# Patient Record
Sex: MALE | Race: WHITE | Employment: UNEMPLOYED | URBAN - METROPOLITAN AREA
[De-identification: names, ages, dates, MRNs, and addresses within clinical notes are randomized per-mention and may not be internally consistent; named-entity substitution may affect disease eponyms.]

---

## 2017-02-01 ENCOUNTER — OFFICE VISIT (OUTPATIENT)
Dept: ORTHOPEDIC SURGERY | Age: 67
End: 2017-02-01

## 2017-02-01 VITALS
DIASTOLIC BLOOD PRESSURE: 53 MMHG | BODY MASS INDEX: 18.55 KG/M2 | WEIGHT: 133 LBS | SYSTOLIC BLOOD PRESSURE: 96 MMHG | HEART RATE: 70 BPM | TEMPERATURE: 98 F

## 2017-02-01 DIAGNOSIS — S82.892D ANKLE FRACTURE, LEFT, CLOSED, WITH ROUTINE HEALING, SUBSEQUENT ENCOUNTER: Primary | ICD-10-CM

## 2017-02-01 NOTE — MR AVS SNAPSHOT
Visit Information Date & Time Provider Department Dept. Phone Encounter #  
 2/1/2017  1:20 PM Nallely Doty MD [facility]  Your Appointments 3/6/2017 11:00 AM  
MEDICARE EXT with Henok Porter MD  
[address] ([facility]) Appt Note: . ... [address] [address] 2 Hemant Shabazzza [address] [address] Upcoming Health Maintenance Date Due ZOSTER VACCINE AGE 60> 2/3/2010 GLAUCOMA SCREENING Q2Y 2/3/2015 MEDICARE YEARLY EXAM 3/1/2017 Pneumococcal 65+ Low/Medium Risk (2 of 2 - PPSV23) 7/12/2019 COLONOSCOPY 2/28/2021 DTaP/Tdap/Td series (3 - Td) 2/28/2026 Allergies as of 2/1/2017  Review Complete On: 2/1/2017 By: Nallely Doty MD  
  
 Severity Noted Reaction Type Reactions Levaquin [Levofloxacin]  03/16/2011    Rash Other reaction(s): mild rash/itching Other Medication  12/10/2009    Other (comments) Bee stings PPD- skin test  
 Pcn [Penicillins]  02/22/2010    Unable to Obtain Other reaction(s): unknown Tuberculin, Old Skin Test  08/22/2014    Other (comments) Venom-honey Bee  08/22/2014 Other reaction(s): anaphylaxis/angioedema, swelling Current Immunizations  Reviewed on 9/10/2014 Name Date Influenza High Dose Vaccine PF 11/22/2016, 12/7/2015 Influenza Vaccine 9/10/2014, 3/24/2014 Influenza Vaccine Split 12/10/2009 Pneumococcal Conjugate (PCV-13) 12/7/2015 Pneumococcal Polysaccharide (PPSV-23) 7/12/2014  5:32 PM  
 Tdap 2/29/2016, 11/3/2011 Not reviewed this visit You Were Diagnosed With   
  
 Codes Comments Ankle fracture, left, closed, with routine healing, subsequent encounter    -  Primary ICD-10-CM: M15.973E ICD-9-CM: V54.19 Vitals BP Pulse Temp Weight(growth percentile) BMI Smoking Status 96/53 (BP 1 Location: Left arm, BP Patient Position: Sitting) 70 98 °F (36.7 °C) (Oral) 133 lb (60.3 kg) 18.55 kg/m2 Never Smoker Vitals History BMI and BSA Data Body Mass Index Body Surface Area 18.55 kg/m 2 1.74 m 2 Preferred Pharmacy Pharmacy Name Phone [facility] [address], [address] 2/3 [phone] Your Updated Medication List  
  
   
This list is accurate as of: 2/1/17  2:19 PM.  Always use your most recent med list.  
  
  
  
  
 alendronate 70 mg tablet Commonly known as:  FOSAMAX Take 1 tabPO once weekly 30 min BEFORE any food of the day  with full glass H20,DON'T LAY DOWN for 30MIN AFTER med taken. calcium-cholecalciferol (D3) tablet Commonly known as:  Calcium 600 + D Take 1 Tab by mouth two (2) times a day. docusate sodium 100 mg capsule Commonly known as:  Temple Lecher Take 1 Cap by mouth nightly as needed for Constipation. EPINEPHrine 0.3 mg/0.3 mL injection Commonly known as:  EPIPEN  
0.3 mL by IntraMUSCular route once as needed. felbamate 600 mg tablet Commonly known as:  Kuldeep Pipe Take 1 Tab by mouth four (4) times daily. fluticasone 50 mcg/actuation nasal spray Commonly known as:  Naman Sacks 2 Sprays by Both Nostrils route daily. food supplemt, lactose-reduced Liqd Commonly known as:  ENSURE Take 237 mL by mouth four (4) times daily. levothyroxine 88 mcg tablet Commonly known as:  SYNTHROID  
TAKE 1 TABLET BY MOUTH DAILY BEFORE BREAKFAST  
  
 loperamide 2 mg tablet Commonly known as:  IMMODIUM Take 2 mg by mouth four (4) times daily as needed for Diarrhea.  
  
 loratadine 10 mg tablet Commonly known as:  CLARITIN  
TAKE 1 TABLET BY MOUTH ONCE DAILY LORazepam 0.5 mg tablet Commonly known as:  ATIVAN Take  by mouth nightly. megestrol 40 mg tablet Commonly known as:  MEGACE  
TAKE 1 TABLET BY MOUTH ONCE DAILY metoprolol tartrate 50 mg tablet Commonly known as:  LOPRESSOR  
TAKE 1 TABLET BY MOUTH TWICE DAILY  
  
 mirtazapine 30 mg tablet Commonly known as:  Consheydila Cullens Take 30 mg by mouth two (2) times a day. multivitamins-minerals-lutein Tab tablet Commonly known as:  MEN'S CENTRUM SILVER WITH LUTEIN Take 1 Tab by mouth daily. polyethylene glycol 17 gram packet Commonly known as:  Leata Sans Take 1 Packet by mouth daily as needed. TOPAMAX 200 mg tablet Generic drug:  topiramate Take 200 mg by mouth two (2) times a day. VIMPAT 200 mg Tab tablet Generic drug:  lacosamide Take 200 mg by mouth two (2) times a day. vitamin a & d ointment Commonly known as:  A&D Apply  to affected area as needed. We Performed the Following AMB POC XRAY, ANKLE; COMPLETE, 3+ VIE [75423 CPT(R)] AMB SUPPLY ORDER [[id number] Custom] Comments:  
 [employer] extra depth shoe that accomodates his AS/AC brace Introducing [facility] & HEALTH SERVICES! Dear Maricarmen Sutton: 
Thank you for requesting a [employer] account. Our records indicate that you already have an active H[employer] account. You can access your account anytime at [email or web address] Did you know that you can access your hospital and ER discharge instructions at any time in Hakia? You can also review all of your test results from your hospital stay or ER visit. Additional Information If you have questions, please visit the Frequently Asked Questions section of the H[employer] website at [email or web address] Remember, Hakia is NOT to be used for urgent needs. For medical emergencies, dial 911. Now available from your iPhone and Android! Please provide this summary of care documentation to your next provider. Your primary care clinician is listed as [address]. If you have any questions after today's visit, please call [phone].

## 2017-02-01 NOTE — PROGRESS NOTES
AMBULATORY PROGRESS NOTE      Patient: Phillip Ashton             MRN: [id number]     SSN: [id number] Body mass index is 18.55 kg/(m^2). YOB: 1950     AGE: 77 y.o. EX: male    PCP: Kumar Armendariz MD    IMPRESSION/DIAGNOSIS AND TREATMENT PLAN     DIAGNOSES  1. Ankle fracture, left, closed, with routine healing, subsequent encounter        Orders Placed This Encounter    Generic Supply Order    [[id number]] Ankle Min 3V      Phillip Ashton understands his diagnoses and the proposed plan. Plan:    My plan is to have him go to [facility] for an extra-depth shoe that will accommodate the AirSport AirCast brace. He can weight bear as tolerated when he transfers. He must have the shoe on and the AirSport AirCast brace on. I am going to see him on an as needed basis. Should his symptoms worsen, of course we will see him sooner. I am happy to fill out any additional paperwork that [employer] might have for me. HPI [address] IS A 77 y.o. male who presents to my outpatient office for follow up on left ankle fracture that occurred in July 2016. He presents today with his caretaker wearing custom clam-shell crow walker boot. He has not walked in several years. ANKLE/FOOT Left      Psychiatry: Alert, accompanied by care givers, non communicative  Tenderness: no tenderness /in cast  Cutaneous: WNL,    Joint Motion: WNL  Joint / Tendon Stability: No Ankle or Subtalar instability or joint laxity.     No peroneal sublux ability or dislocation  Alignment: Normal Foot Alignment and Flexible  Neuro Motor/Sensory: NL/NL  Vascular: NL foot/ankle pulses  Lymphatics: No extremity lymphedema, No calf swelling, no tenderness to calf muscles. CHART REVIEW     Past Medical History   Diagnosis Date    Cataract 12/10/2009    Chronic back pain      History of chronic back problems off and on for the past several years.   He has responded to back injections in the past.      Dysphagia     Fall      The patient fell during a seizure. He noted increased back pain since that time. X-rays were reported negative at [facility].     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  loratadine (CLARITIN) 10 mg tablet TAKE 1 TABLET BY MOUTH ONCE DAILY    EPINEPHrine (EPIPEN) 0.3 mg/0.3 mL injection 0.3 mL by IntraMUSCular route once as needed.  alendronate (FOSAMAX) 70 mg tablet Take 1 tabPO once weekly 30 min BEFORE any food of the day  with full glass H20,DON'T LAY DOWN for 30MIN AFTER med taken.  polyethylene glycol (MIRALAX) 17 gram packet Take 1 Packet by mouth daily as needed.  vitamin a & d (A&D) ointment Apply  to affected area as needed.  docusate sodium (COLACE) 100 mg capsule Take 1 Cap by mouth nightly as needed for Constipation.  fluticasone (FLONASE) 50 mcg/actuation nasal spray 2 Sprays by Both Nostrils route daily.  calcium-cholecalciferol, D3, (CALCIUM 600 + D) tablet Take 1 Tab by mouth two (2) times a day.  LORazepam (ATIVAN) 0.5 mg tablet Take  by mouth nightly.  mirtazapine (REMERON) 30 mg tablet Take 30 mg by mouth two (2) times a day.  food supplement, lactose-free (ENSURE) liqd Take 237 mL by mouth four (4) times daily.  lacosamide (VIMPAT) 200 mg Tab tablet Take 200 mg by mouth two (2) times a day.     topiramate (TOPAMAX) 200 mg tablet Take 200 mg by mouth two (2) times a day.  metoprolol tartrate (LOPRESSOR) 50 mg tablet TAKE 1 TABLET BY MOUTH TWICE DAILY    multivitamins-minerals-lutein (MEN'S CENTRUM SILVER WITH LUTEIN) tab tablet Take 1 Tab by mouth daily.  felbamate (FELBATOL) 600 mg tablet Take 1 Tab by mouth four (4) times daily. No current facility-administered medications for this visit. Allergies   Allergen Reactions    Levaquin [Levofloxacin] Rash     Other reaction(s): mild rash/itching    Other Medication Other (comments)     Bee stings  PPD- skin test    Pcn [Penicillins] Unable to Obtain     Other reaction(s): unknown    Tuberculin, Old Skin Test Other (comments)    Venom-Honey Bee      Other reaction(s): anaphylaxis/angioedema, swelling     Past Surgical History   Procedure Laterality Date    Hx cataract removal       right eye    Hx orthopaedic       vertebral compression fracture     Social History     Occupational History    Not on file. Social History Main Topics    Smoking status: Never Smoker    Smokeless tobacco: Never Used    Alcohol use No    Drug use: No    Sexual activity: No     Family History   Problem Relation Age of Onset    Cancer Mother     Cancer Father        REVIEW OF SYSTEMS : 2/1/2017  ALL BELOW ARE Negative except : SEE HPI       Constitutional: Negative for fever, chills and weight loss. Neg Weigh Loss  Cardiovascular: Negative for chest pain, claudication and leg swelling. SOB, BAXTER   Gastrointestinal: Negative for  pain, N/V/D/C, Blood in stool or urine,dysuria, hematuria,        Incontinence, pelvic pain  Musculoskeletal: see HPI. Neurological: Negative for dizziness and weakness. Negative for headaches,Visual Changes, Confusion, Seizures,   Psychiatric/Behavioral: Negative for depression, memory loss and substance abuse. Extremities:  Negative for  hair changes, rash or skin lesion changes.   Hematologic: Negative for Bleeding problems, bruising, pallor or swollen lymph nodes. Peripheral Vascular: No calf pain, vascular vein tenderness to calf pain              No calf throbbing, posterior knee throbbing pain    DIAGNOSTIC IMAGING     ANKLE X RAYS 3 VIEWS Left   2/1/2017    SOFT TISSUES:                No soft tissue calcifications,   No Calcified blood vessels     Soft tissue swelling location:    mild  to fibula region        no medial aspect    no dorsal midfoot/forefoot     OSSEOUS:      Fractures of the lateral malleolus is not  present as he is healed the fibual fx  Healing of the fracture/s is/are: complete,    Fibula slight ankle valgus and Slight shortening   Ankle mortise alignment is: plantar flexion deformity, slight valgus alignment  Talar Dome:  No Osteochondral defects seen      No subluxations, dislocations are noted to ankle or subtalar joint regions  Mineralization: suggests no Osteopenia   Bone Spurs: No significant bone spurs       I have personally reviewed the images of the above study. The interpretation of this study is my professional opinion.     Florin Garcia MD  2/1/2017  2:08 PM         Written by Yuly Brambila, as dictated by Khurram Reed MD.

## 2017-02-01 NOTE — PROCEDURES
ANKLE X RAYS 3 VIEWS Left   2/1/2017    SOFT TISSUES:                No soft tissue calcifications,   No Calcified blood vessels     Soft tissue swelling location:    mild  to fibula region        no medial aspect    no dorsal midfoot/forefoot     OSSEOUS:      Fractures of the lateral malleolus is not  present as he is healed the fibual fx  Healing of the fracture/s is/are: complete,    Fibula slight ankle valgus and Slight shortening   Ankle mortise alignment is: plantar flexion deformity, slight valgus alignment  Talar Dome:  No Osteochondral defects seen      No subluxations, dislocations are noted to ankle or subtalar joint regions  Mineralization: suggests no Osteopenia   Bone Spurs: No significant bone spurs       I have personally reviewed the images of the above study. The interpretation of this study is my professional opinion.     Alejandro Dykes MD  2/1/2017  2:08 PM

## 2017-02-21 RX ORDER — MULTIVIT-MIN/FA/LYCOPEN/LUTEIN .4-300-25
TABLET ORAL
Qty: 30 TAB | Refills: 11 | Status: SHIPPED | OUTPATIENT
Start: 2017-02-21 | End: 2017-06-25 | Stop reason: ALTCHOICE

## 2017-02-21 RX ORDER — MULTIVITAMIN
TABLET ORAL
Qty: 60 TAB | Refills: 11 | Status: SHIPPED | OUTPATIENT
Start: 2017-02-21 | End: 2017-03-06 | Stop reason: SDUPTHER

## 2017-03-06 ENCOUNTER — OFFICE VISIT (OUTPATIENT)
Dept: FAMILY MEDICINE CLINIC | Age: 67
End: 2017-03-06

## 2017-03-06 VITALS — HEIGHT: 71 IN | RESPIRATION RATE: 16 BRPM

## 2017-03-06 DIAGNOSIS — Z00.00 ROUTINE GENERAL MEDICAL EXAMINATION AT A HEALTH CARE FACILITY: Primary | ICD-10-CM

## 2017-03-06 RX ORDER — MULTIVITAMIN
TABLET ORAL
Qty: 60 TAB | Refills: 11 | Status: SHIPPED | OUTPATIENT
Start: 2017-03-06 | End: 2018-03-12 | Stop reason: SDUPTHER

## 2017-03-06 NOTE — PATIENT INSTRUCTIONS

## 2017-03-06 NOTE — MR AVS SNAPSHOT
Visit Information Date & Time Provider Department Dept. Phone Encounter #  
 3/6/2017 11:00 AM Henok Porter [address] [id number] Upcoming Health Maintenance Date Due ZOSTER VACCINE AGE 60> 2/3/2010 GLAUCOMA SCREENING Q2Y 2/3/2015 MEDICARE YEARLY EXAM 3/1/2017 Pneumococcal 65+ Low/Medium Risk (2 of 2 - PPSV23) 7/12/2019 COLONOSCOPY 2/28/2021 DTaP/Tdap/Td series (3 - Td) 2/28/2026 Allergies as of 3/6/2017  Review Complete On: 3/6/2017 By: Simba Guzmán LPN Severity Noted Reaction Type Reactions Levaquin [Levofloxacin]  03/16/2011    Rash Other reaction(s): mild rash/itching Other Medication  12/10/2009    Other (comments) Bee stings PPD- skin test  
 Pcn [Penicillins]  02/22/2010    Unable to Obtain Other reaction(s): unknown Tuberculin, Old Skin Test  08/22/2014    Other (comments) Venom-honey Bee  08/22/2014 Other reaction(s): anaphylaxis/angioedema, swelling Current Immunizations  Reviewed on 9/10/2014 Name Date Influenza High Dose Vaccine PF 11/22/2016, 12/7/2015 Influenza Vaccine 9/10/2014, 3/24/2014 Influenza Vaccine Split 12/10/2009 Pneumococcal Conjugate (PCV-13) 12/7/2015 Pneumococcal Polysaccharide (PPSV-23) 7/12/2014  5:32 PM  
 Tdap 2/29/2016, 11/3/2011 Not reviewed this visit You Were Diagnosed With   
  
 Codes Comments Routine general medical examination at a health care facility    -  Primary ICD-10-CM: Z00.00 ICD-9-CM: V70.0 Vitals Resp Height(growth percentile) Smoking Status 16 5' 11\" (1.803 m) Never Smoker Preferred Pharmacy Pharmacy Name Phone [facility] [address] [facility] 2/3 [phone] Your Updated Medication List  
  
   
This list is accurate as of: 3/6/17 11:45 AM.  Always use your most recent med list.  
  
  
  
  
 alendronate 70 mg tablet Commonly known as:  FOSAMAX Take 1 tabPO once weekly 30 min BEFORE any food of the day  with full glass H20,DON'T LAY DOWN for 30MIN AFTER med taken. calcium-cholecalciferol (D3) tablet Commonly known as:  CALTRATE 600+D TAKE 1 TABLET BY MOUTH TWICE DAILY CEROVITE SENIOR Tab tablet Generic drug:  multivitamins-minerals-lutein TAKE 1 TABLET BY MOUTH ONCE DAILY docusate sodium 100 mg capsule Commonly known as:  Melody Peels Take 1 Cap by mouth nightly as needed for Constipation. EPINEPHrine 0.3 mg/0.3 mL injection Commonly known as:  EPIPEN  
0.3 mL by IntraMUSCular route once as needed. felbamate 600 mg tablet Commonly known as:  Massiel Argelia Take 1 Tab by mouth four (4) times daily. fluticasone 50 mcg/actuation nasal spray Commonly known as:  Abelino Peel 2 Sprays by Both Nostrils route daily. food supplemt, lactose-reduced Liqd Commonly known as:  ENSURE Take 237 mL by mouth four (4) times daily. levothyroxine 88 mcg tablet Commonly known as:  SYNTHROID  
TAKE 1 TABLET BY MOUTH DAILY BEFORE BREAKFAST  
  
 loperamide 2 mg tablet Commonly known as:  IMMODIUM Take 2 mg by mouth four (4) times daily as needed for Diarrhea.  
  
 loratadine 10 mg tablet Commonly known as:  CLARITIN  
TAKE 1 TABLET BY MOUTH ONCE DAILY LORazepam 0.5 mg tablet Commonly known as:  ATIVAN Take  by mouth nightly. megestrol 40 mg tablet Commonly known as:  MEGACE  
TAKE 1 TABLET BY MOUTH ONCE DAILY  
  
 metoprolol tartrate 50 mg tablet Commonly known as:  LOPRESSOR  
TAKE 1 TABLET BY MOUTH TWICE DAILY  
  
 mirtazapine 30 mg tablet Commonly known as:  [place] Brightly Take 30 mg by mouth two (2) times a day. multivit-min-FA-lycopen-lutein 0.4-300-250 mg-mcg-mcg Tab Commonly known as:  CENTRUM SILVER Take 1 Tab by mouth daily. polyethylene glycol 17 gram packet Commonly known as:  Evie Floor Take 1 Packet by mouth daily as needed. TOPAMAX 200 mg tablet Generic drug:  topiramate Take 200 mg by mouth two (2) times a day. VIMPAT 200 mg Tab tablet Generic drug:  lacosamide Take 200 mg by mouth two (2) times a day. vitamin a & d ointment Commonly known as:  A&D Apply  to affected area as needed. Prescriptions Sent to Pharmacy Refills  
 multivit-min-FA-lycopen-lutein (CENTRUM SILVER) 0.4-300-250 mg-mcg-mcg tab 11 Sig: Take 1 Tab by mouth daily. Class: Normal  
 Pharmacy: [address] 2/3 Ph #: [phone] Route: Oral  
 calcium-cholecalciferol, D3, (CALTRATE 600+D) tablet 11 Sig: TAKE 1 TABLET BY MOUTH TWICE DAILY Class: Normal  
 Pharmacy: [address] 2/3 Ph #: [phone] Patient Instructions Well Visit, Over 72: Care Instructions Your Care Instructions Physical exams can help you stay healthy. Your doctor has checked your overall health and may have suggested ways to take good care of yourself. He or she also may have recommended tests. At home, you can help prevent illness with healthy eating, regular exercise, and other steps. Follow-up care is a key part of your treatment and safety. Be sure to make and go to all appointments, and call your doctor if you are having problems. It's also a good idea to know your test results and keep a list of the medicines you take. How can you care for yourself at home? · Reach and stay at a healthy weight. This will lower your risk for many problems, such as obesity, diabetes, heart disease, and high blood pressure. · Get at least 30 minutes of exercise on most days of the week. Walking is a good choice. You also may want to do other activities, such as running, swimming, cycling, or playing tennis or team sports. · Do not smoke. Smoking can make health problems worse.  If you need help quitting, talk to your doctor about stop-smoking programs and medicines. These can increase your chances of quitting for good. · Protect your skin from too much sun. When you're outdoors from 10 a.m. to 4 p.m., stay in the shade or cover up with clothing and a hat with a wide brim. Wear sunglasses that block UV rays. Even when it's cloudy, put broad-spectrum sunscreen (SPF 30 or higher) on any exposed skin. · See a dentist one or two times a year for checkups and to have your teeth cleaned. · Wear a seat belt in the car. · Limit alcohol to 2 drinks a day for men and 1 drink a day for women. Too much alcohol can cause health problems. Follow your doctor's advice about when to have certain tests. These tests can spot problems early. For men and women · Cholesterol. Your doctor will tell you how often to have this done based on your overall health and other things that can increase your risk for heart attack and stroke. · Blood pressure. Have your blood pressure checked during a routine doctor visit. Your doctor will tell you how often to check your blood pressure based on your age, your blood pressure results, and other factors. · Diabetes. Ask your doctor whether you should have tests for diabetes. · Vision. Experts recommend that you have yearly exams for glaucoma and other age-related eye problems. · Hearing. Tell your doctor if you notice any change in your hearing. You can have tests to find out how well you hear. · Colon cancer tests. Keep having colon cancer tests as your doctor recommends. You can have one of several types of tests. · Heart attack and stroke risk. At least every 4 to 6 years, you should have your risk for heart attack and stroke assessed. Your doctor uses factors such as your age, blood pressure, cholesterol, and whether you smoke or have diabetes to show what your risk for a heart attack or stroke is over the next 10 years. · Osteoporosis. Talk to your doctor about whether you should have a bone density test to find out whether you have thinning bones. Also ask your doctor about whether you should take calcium and vitamin D supplements. For women · Pap test and pelvic exam. You may no longer need a Pap test. Talk with your doctor about whether to stop or continue to have Pap tests. · Breast exam and mammogram. Ask how often you should have a mammogram, which is an X-ray of your breasts. A mammogram can spot breast cancer before it can be felt and when it is easiest to treat. · Thyroid disease. Talk to your doctor about whether to have your thyroid checked as part of a regular physical exam. Women have an increased chance of a thyroid problem. For men · Prostate exam. Talk to your doctor about whether you should have a blood test (called a PSA test) for prostate cancer. Experts disagree on whether men should have this test. Some experts recommend that you discuss the benefits and risks of the test with your doctor. · Abdominal aortic aneurysm. Ask your doctor whether you should have a test to check for an aneurysm. You may need a test if you ever smoked or if your parent, brother, sister, or child has had an aneurysm. When should you call for help? Watch closely for changes in your health, and be sure to contact your doctor if you have any problems or symptoms that concern you. Where can you learn more? Go to http://elvia-arden.info/. Enter E510 in the search box to learn more about \"Well Visit, Over 65: Care Instructions. \" Current as of: July 19, 2016 Content Version: 11.1 © 9979-0328 Healthwise, Incorporated. Care instructions adapted under license by [employer] (which disclaims liability or warranty for this information).  If you have questions about a medical condition or this instruction, always ask your healthcare professional. [employer] disclaims any warranty or liability for your use of this information. Introducing [facility] & HEALTH SERVICES! Dear Antonio Caban: 
Thank you for requesting a [employer] account. Our records indicate that you already have an active [employer] account. You can access your account anytime at [email or web address] Did you know that you can access your hospital and ER discharge instructions at any time in [employer]? You can also review all of your test results from your hospital stay or ER visit. Additional Information If you have questions, please visit the Frequently Asked Questions section of the [employer] website at [email or web address] Remember, [employer] is NOT to be used for urgent needs. For medical emergencies, dial 911. Now available from your iPhone and Android! Please provide this summary of care documentation to your next provider. Your primary care clinician is listed as [address]. If you have any questions after today's visit, please call [phone].

## 2017-03-06 NOTE — PROGRESS NOTES
This is a Subsequent Medicare Annual Wellness Visit providing Personalized Prevention Plan Services (PPPS) (Performed 12 months after initial AWV and PPPS )    I have reviewed the patient's medical history in detail and updated the computerized patient record. Pt somewhat agitated today; would not let nursing get all vitals. Pt also refuses BP and exam from this provider today. He is a [facility] resident; He is here with a counselor;  Reports some anorexia which is chronic ; Pt is on megace already. History     Past Medical History:   Diagnosis Date    Cataract 12/10/2009    Chronic back pain     History of chronic back problems off and on for the past several years. He has responded to back injections in the past.      Dysphagia     Fall     The patient fell during a seizure. He noted increased back pain since that time. X-rays were reported negative at [facility].     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 30 Tab 11    calcium-cholecalciferol, D3, (CALTRATE 600+D) tablet TAKE 1 TABLET BY MOUTH TWICE DAILY 60 Tab 11    levothyroxine (SYNTHROID) 88 mcg tablet TAKE 1 TABLET BY MOUTH DAILY BEFORE BREAKFAST 30 Tab 6    megestrol (MEGACE) 40 mg tablet TAKE 1 TABLET BY MOUTH ONCE DAILY 30 Tab 3    loperamide (IMMODIUM) 2 mg tablet Take 2 mg by mouth four (4) times daily as needed for Diarrhea.       loratadine (CLARITIN) 10 mg tablet TAKE 1 TABLET BY MOUTH ONCE DAILY 30 Tab 6    EPINEPHrine (EPIPEN) 0.3 mg/0.3 mL injection 0.3 mL by IntraMUSCular route once as needed. 1 mL 3    alendronate (FOSAMAX) 70 mg tablet Take 1 tabPO once weekly 30 min BEFORE any food of the day  with full glass H20,DON'T LAY DOWN for 30MIN AFTER med taken. 4 Tab 6    polyethylene glycol (MIRALAX) 17 gram packet Take 1 Packet by mouth daily as needed. 30 Each 6    metoprolol tartrate (LOPRESSOR) 50 mg tablet TAKE 1 TABLET BY MOUTH TWICE DAILY 60 Tab 6    vitamin a & d (A&D) ointment Apply  to affected area as needed. 60 g 6    docusate sodium (COLACE) 100 mg capsule Take 1 Cap by mouth nightly as needed for Constipation. 90 Cap 2    fluticasone (FLONASE) 50 mcg/actuation nasal spray 2 Sprays by Both Nostrils route daily. 1 Bottle 6    LORazepam (ATIVAN) 0.5 mg tablet Take  by mouth nightly.  mirtazapine (REMERON) 30 mg tablet Take 30 mg by mouth two (2) times a day.  food supplement, lactose-free (ENSURE) liqd Take 237 mL by mouth four (4) times daily. 1000 mL 3    lacosamide (VIMPAT) 200 mg Tab tablet Take 200 mg by mouth two (2) times a day.  felbamate (FELBATOL) 600 mg tablet Take 1 Tab by mouth four (4) times daily. 120 Tab 6    topiramate (TOPAMAX) 200 mg tablet Take 200 mg by mouth two (2) times a day.       CEROVITE SENIOR tab tablet TAKE 1 TABLET BY MOUTH ONCE DAILY 30 Tab 11     Allergies   Allergen Reactions    Levaquin [Levofloxacin] Rash     Other reaction(s): mild rash/itching    Other Medication Other (comments)     Bee stings  PPD- skin test    Pcn [Penicillins] Unable to Obtain     Other reaction(s): unknown    Tuberculin, Old Skin Test Other (comments)    Venom-Honey Bee      Other reaction(s): anaphylaxis/angioedema, swelling     Family History   Problem Relation Age of Onset    Cancer Mother     Cancer Father      Social History   Substance Use Topics    Smoking status: Never Smoker  Smokeless tobacco: Never Used    Alcohol use No     Patient Active Problem List   Diagnosis Code    Hypothyroidism E03.9    Hearing loss H91.90    PPD positive R76.11    Seizure ([address] R56.9    Cataract H26.9    Osteoporosis M81.0    Mental retardation F79    Abnormal finding on EKG R94.31    Pneumonia J18.9       Depression Risk Factor Screening:     PHQ 2 / 9, over the last two weeks 1/29/2015   Little interest or pleasure in doing things Several days   Feeling down, depressed or hopeless Several days   Total Score PHQ 2 2     Alcohol Risk Factor Screening: On any occasion during the past 3 months, have you had more than 4 drinks containing alcohol? No    Do you average more than 14 drinks per week? No      Functional Ability and Level of Safety:     Hearing Loss   none    Activities of Daily Living   Total assistance. Requires assistance with: ambulation, bathing and hygiene, feeding, continence, grooming, toileting and dressing    Fall Risk     Fall Risk Assessment, last 12 mths 7/22/2016   Able to walk? Yes   Fall in past 12 months? Yes   Fall with injury? Yes   Number of falls in past 12 months 4   Fall Risk Score 5     Abuse Screen   unable to answer question    Review of Systems   A comprehensive review of systems was negative except for that written in the HPI. Physical Examination     Evaluation of Cognitive Function:  Mood/affect:  neutral  Appearance: age appropriate  Family member/caregiver input: none    Visit Vitals    Resp 16    Ht 5' 11\" (1.803 m)     General appearance: alert, cooperative, no distress, appears stated age  Neck: supple, symmetrical, trachea midline, no adenopathy, thyroid: not enlarged, symmetric, no tenderness/mass/nodules, no carotid bruit and no JVD  Lungs: clear to auscultation bilaterally  Heart: regular rate and rhythm, S1, S2 normal, no murmur, click, rub or gallop  Abdomen: soft, non-tender.  Bowel sounds normal. No masses,  no organomegaly  Extremities: extremities normal, atraumatic, no cyanosis or edema      Patient Care Team:  Medina Cary MD as PCP - General (Family Practice)  MD Stella Rojas MD (Gastroenterology)  Abimbola Young MD (Dermatology)  Michael Foster DPM (Podiatry)  Wesley Monet MD (Neurology)  Madeleine Marroquin MD (Orthopedic Surgery)  Pratibha Mcdermott MD (Ophthalmology)  Stella Winkler MD (Gastroenterology)  Abimbola Young MD (Dermatology)  Michael Foster DPM (Podiatry)  Wesley Monet MD (Neurology)  Madeleine Marroquin MD (Orthopedic Surgery)  Pratibha Mcdermott MD (Ophthalmology)  Anup Ayala MD    Advice/Referrals/Counseling   Education and counseling provided:  Are appropriate based on today's review and evaluation      Assessment/Plan       ICD-10-CM ICD-9-CM    1. Routine general medical examination at a health care facility Z00.00 V70.0    . As above, pt clinically stable; he is somewhat agitated today ; Advised follow up with Psychiatry with respect to increased agitation  Follow-up Disposition:  Return in about 5 months (around 8/6/2017). An After Visit Summary was printed and given to the patient. This has been fully explained to the patient, who indicates understanding.   Advised supplement with boost/ ensure for meal replacement  Filled out forms needed for [facility]

## 2017-03-14 ENCOUNTER — TELEPHONE (OUTPATIENT)
Dept: FAMILY MEDICINE CLINIC | Age: 67
End: 2017-03-14

## 2017-03-14 NOTE — TELEPHONE ENCOUNTER
Spoke with Geneva Patel at [facility] to inform that  chest x-ray was normal. Geneva Patel verbalized understanding.

## 2017-03-14 NOTE — TELEPHONE ENCOUNTER
Spoke with Mark Fairchild at [facility] to gather information. Helena Handy would like to inquire if any concerns with chest x-ray done on 02/22/2017. Will inform Dr. Christine Reina. Helena Handy verbalized understanding and asked that return call be sent to [facility].

## 2017-06-05 NOTE — TELEPHONE ENCOUNTER
Requested Prescriptions     Pending Prescriptions Disp Refills    alendronate (FOSAMAX) 70 mg tablet 4 Tab 6     Sig: Take 1 tabPO once weekly 30 min BEFORE any food of the day  with full glass H20,DON'T LAY DOWN for 30MIN AFTER med taken.     metoprolol tartrate (LOPRESSOR) 50 mg tablet 60 Tab 6     Sig: TAKE 1 TABLET BY MOUTH TWICE DAILY    loratadine (CLARITIN) 10 mg tablet 30 Tab 6     Sig: TAKE 1 TABLET BY MOUTH ONCE DAILY

## 2017-06-06 ENCOUNTER — TELEPHONE (OUTPATIENT)
Dept: FAMILY MEDICINE CLINIC | Age: 67
End: 2017-06-06

## 2017-06-06 RX ORDER — ALENDRONATE SODIUM 70 MG/1
TABLET ORAL
Qty: 4 TAB | Refills: 6 | Status: SHIPPED | OUTPATIENT
Start: 2017-06-06 | End: 2017-11-14 | Stop reason: ALTCHOICE

## 2017-06-06 RX ORDER — METOPROLOL TARTRATE 50 MG/1
TABLET ORAL
Qty: 60 TAB | Refills: 6 | Status: SHIPPED | OUTPATIENT
Start: 2017-06-06 | End: 2017-12-22 | Stop reason: SDUPTHER

## 2017-06-06 RX ORDER — LORATADINE 10 MG/1
TABLET ORAL
Qty: 30 TAB | Refills: 6 | Status: SHIPPED | OUTPATIENT
Start: 2017-06-06 | End: 2018-03-12 | Stop reason: SDUPTHER

## 2017-06-06 NOTE — TELEPHONE ENCOUNTER
Spoke with Michael Amos at [facility] that stated that form was to be reviewed by audit personnel and our office would be contacted if any further information was needed. Informed as per Dr. Ale Sharma, that  can contact the office to speak with Dr. Ale Sharma about concerns. Ms. Myla Zapata verbalized understanding.

## 2017-06-06 NOTE — TELEPHONE ENCOUNTER
Spoke with Richar Pillai at [facility] to inquire about Certificate of Physical Examination. Information that was not gathered was not filled in on form and did not appear to need any documentation. Ms. Beatriz Oliveros stated that form would need to be reviewed and to fax form to [facility]. Ms. Beatriz Oliveros requested a letter to explain chest x-ray done on 02/22/2017 for  that had questions. Will inform Dr. Barbara Renteria. Ms. Beatriz Oliveros verbalized understanding. Form was faxed to office and confirmation received.

## 2017-06-16 ENCOUNTER — OFFICE VISIT (OUTPATIENT)
Dept: ORTHOPEDIC SURGERY | Facility: CLINIC | Age: 67
End: 2017-06-16

## 2017-06-16 VITALS — WEIGHT: 150 LBS | BODY MASS INDEX: 24.21 KG/M2

## 2017-06-16 DIAGNOSIS — H91.90 HEARING LOSS, UNSPECIFIED HEARING LOSS TYPE, UNSPECIFIED LATERALITY: ICD-10-CM

## 2017-06-16 DIAGNOSIS — M85.80 OSTEOPENIA DETERMINED BY X-RAY: ICD-10-CM

## 2017-06-16 DIAGNOSIS — F79 INTELLECTUAL DISABILITY: ICD-10-CM

## 2017-06-16 DIAGNOSIS — G40.909 SEIZURE DISORDER (HCC): ICD-10-CM

## 2017-06-16 DIAGNOSIS — S42.032A CLOSED DISPLACED FRACTURE OF ACROMIAL END OF LEFT CLAVICLE, INITIAL ENCOUNTER: Primary | ICD-10-CM

## 2017-06-16 DIAGNOSIS — M25.512 ACUTE PAIN OF LEFT SHOULDER: ICD-10-CM

## 2017-06-16 RX ORDER — ACETAMINOPHEN 500 MG
500 TABLET ORAL
Qty: 60 TAB | Refills: 3 | Status: SHIPPED | OUTPATIENT
Start: 2017-06-16 | End: 2017-07-15

## 2017-06-16 NOTE — PROGRESS NOTES
Patient: Dallas Martinez                MRN: [id number]       SSN: [id number]  YOB: 1950        AGE: 79 y.o. SEX: male    PCP: Eleni Castañeda MD  06/16/17    Chief Complaint   Patient presents with    Shoulder Pain     Left     HISTORY:  Dallas Martinez is a 79 y.o. male who is seen for a collarbone fracture. Patient was seen in the ED yesterday, 6/15/17, complaining of left shoulder pain. Patient's caregiver states that he may have fallen 3 weeks ago. He also definitely fell on 4/27/17. History is unclear as to exact date of shoulder injury causing his acute clavicle fracture. Caregiver states that the staff is claiming that he did not fall. He denies any injury or trauma but is a poor historian. He has tried Ibuprofen as needed. Patient had an ankle fracture treated in a cast 7/21/16 by Dr. Denice Alcala. He has been on Fosamax since 2006. He has not had a bone density scan since 2010. Presents to the office wearing a left arm sling and in a wheelchair. Pain Assessment  10/13/2016   Location of Pain Ankle   Location Modifiers Right   Severity of Pain 0   Quality of Pain -   Duration of Pain -   Frequency of Pain Intermittent   Limiting Behavior Yes   Relieving Factors Rest;Elevation   Result of Injury No     Occupation, etc:  Mr. Cindy Nichols is non communicative with intellectual disability. He is assisted by a caregiver, Margarita Mtz. Amber Scott is a supervisor of [facility] where the patient is cared for. Patient's parents have passed away. He does still have a living sister who visits occasionally. Patient is wheelchair bound. Current weight is 150 pounds. He is 5'11\" tall. In 2011 he fell and had a major hematoma. Since then he has only been able to bed baths. Patient was previously treated by me for an L1 compression.     No results found for: HBA1C, HGBE8, EXH3TQIL, WUP9XAHQ, OHI2TXPG  Weight Metrics 6/16/2017 6/15/2017 2/1/2017 12/7/2016 11/28/2016 11/2/2016 10/13/2016 Weight 150 lb 150 lb 133 lb 133 lb 6.1 oz 125 lb 125 lb 125 lb   BMI 24.21 kg/m2 24.21 kg/m2 18.55 kg/m2 18.6 kg/m2 17.43 kg/m2 17.43 kg/m2 17.43 kg/m2       Patient Active Problem List   Diagnosis Code    Hypothyroidism E03.9    Hearing loss H91.90    PPD positive R76.11    Seizure ([address] R56.9    Cataract H26.9    Osteoporosis M81.0    Mental retardation F79    Abnormal finding on EKG R94.31    Pneumonia J18.9     REVIEW OF SYSTEMS: All Below are Negative except: See HPI. [place] [employer]   Constitutional: negative for fever, chills, and weight loss. Cardiovascular: negative for chest pain, claudication, leg swelling, SOB, BAXTER   Gastrointestinal: Negative for pain, N/V/C/D, Blood in stool or urine, dysuria,  hematuria, incontinence, pelvic pain. Musculoskeletal: See HPI   Neurological: Negative for dizziness and weakness. Negative for headaches, Visual changes, confusion, seizures   Phychiatric/Behavioral: Negative for depression, memory loss, substance  abuse. Extremities: Negative for hair changes, rash, or skin lesion changes. Hematologic: Negative for bleeding problems, bruising, pallor or swollen lymph  nodes   Peripheral Vascular: No calf pain, no circulation deficits. Social History     Social History    Marital status: SINGLE     Spouse name: N/A    Number of children: N/A    Years of education: N/A     Occupational History    Not on file.      Social History Main Topics    Smoking status: Never Smoker    Smokeless tobacco: Never Used    Alcohol use No    Drug use: No    Sexual activity: No     Other Topics Concern    Caffeine Concern Yes     1 cup day    Exercise Yes     patient tries to be active everyday   [place] Yes     Social History Narrative      Allergies   Allergen Reactions    Levaquin [Levofloxacin] Rash     Other reaction(s): mild rash/itching    Other Medication Other (comments)     Bee stings  PPD- skin test    Pcn [Penicillins] Unable to Obtain     Other reaction(s): unknown    Tuberculin, Old Skin Test Other (comments)    Venom-Honey Bee      Other reaction(s): anaphylaxis/angioedema, swelling      Current Outpatient Prescriptions   Medication Sig    alendronate (FOSAMAX) 70 mg tablet Take 1 tabPO once weekly 30 min BEFORE any food of the day  with full glass H20,DON'T LAY DOWN for 30MIN AFTER med taken.  metoprolol tartrate (LOPRESSOR) 50 mg tablet TAKE 1 TABLET BY MOUTH TWICE DAILY    loratadine (CLARITIN) 10 mg tablet TAKE 1 TABLET BY MOUTH ONCE DAILY    megestrol (MEGACE) 40 mg tablet TAKE 1 TABLET BY MOUTH ONCE DAILY    multivit-min-FA-lycopen-lutein (CENTRUM SILVER) 0.4-300-250 mg-mcg-mcg tab Take 1 Tab by mouth daily.  calcium-cholecalciferol, D3, (CALTRATE 600+D) tablet TAKE 1 TABLET BY MOUTH TWICE DAILY    CEROVITE SENIOR tab tablet TAKE 1 TABLET BY MOUTH ONCE DAILY    levothyroxine (SYNTHROID) 88 mcg tablet TAKE 1 TABLET BY MOUTH DAILY BEFORE BREAKFAST    megestrol (MEGACE) 40 mg tablet TAKE 1 TABLET BY MOUTH ONCE DAILY    loperamide (IMMODIUM) 2 mg tablet Take 2 mg by mouth four (4) times daily as needed for Diarrhea.  EPINEPHrine (EPIPEN) 0.3 mg/0.3 mL injection 0.3 mL by IntraMUSCular route once as needed.  polyethylene glycol (MIRALAX) 17 gram packet Take 1 Packet by mouth daily as needed.  vitamin a & d (A&D) ointment Apply  to affected area as needed.  docusate sodium (COLACE) 100 mg capsule Take 1 Cap by mouth nightly as needed for Constipation.  fluticasone (FLONASE) 50 mcg/actuation nasal spray 2 Sprays by Both Nostrils route daily.  LORazepam (ATIVAN) 0.5 mg tablet Take  by mouth nightly.  mirtazapine (REMERON) 30 mg tablet Take 30 mg by mouth two (2) times a day.  lacosamide (VIMPAT) 200 mg Tab tablet Take 200 mg by mouth two (2) times a day.  felbamate (FELBATOL) 600 mg tablet Take 1 Tab by mouth four (4) times daily.  topiramate (TOPAMAX) 200 mg tablet Take 200 mg by mouth two (2) times a day.     food supplement, lactose-free (ENSURE) liqd Take 237 mL by mouth four (4) times daily. No current facility-administered medications for this visit. PHYSICAL EXAMINATION:  Visit Vitals    Wt 150 lb (68 kg)    BMI 24.21 kg/m2      ORTHO EXAMINATION:  Examination Left shoulder   Skin Intact   Effusion -   Biceps deformity -   Atrophy +   AC joint tenderness +++   Acromial tenderness +   Biceps tenderness -   Forward flexion/Elevation    Active abduction    External rotation ROM 30   Internal rotation ROM 70   Apprehension na   Impingement na   Drop Arm Test na   Neurovascular Intact   Bruising, tenderness and swelling over distal clavicle fracture site      RADIOGRAPHS:  XR OF LEFT CLAVICLE 6/15/17  IMPRESSION:  There is minimally displaced fracture involving the distal left clavicle. XR OF LEFT SHOULDER 6/15/17  IMPRESSION:  There is distal left clavicular fracture. No other fracture or dislocation is  seen.     IMPRESSION:      ICD-10-CM ICD-9-CM    1. Closed displaced fracture of acromial end of left clavicle, initial encounter S42.032A 810.03 SC CLOSED RX CLAVICLE FRACTURE      REFERRAL TO ENDOCRINOLOGY   2. Acute pain of left shoulder M25.512 719.41 REFERRAL TO ENDOCRINOLOGY   3. Osteopenia determined by x-ray M85.80 733.90 REFERRAL TO ENDOCRINOLOGY     PLAN:  Endocrinology referral to Dr. Chelsey Ricks for further treatment and bone density scan. Discussed with the caregiver that he should continue to work on mobility exercises. Provided with prescription for Tylenol 500 mg 1 Q6H PRN. Continue sling as needed. He will follow up 3 weeks with re x ray.       Scribed by Hiwot Nunez ([facility]) as dictated by Rachel Mcclure MD

## 2017-06-16 NOTE — PATIENT INSTRUCTIONS
Broken Collarbone: Care Instructions  Your Care Instructions    You have broken or cracked your collarbone, or clavicle. The collarbone is the long, slightly curved bone that connects the shoulder to the chest. It supports the shoulder. A broken collarbone may take 6 weeks or longer to heal. You will need to wear an arm sling to keep the broken bone from moving while it heals. At first, it may hurt to move your arm. This will get better with time. You heal best when you take good care of yourself. Eat a variety of healthy foods, and don't smoke. Follow-up care is a key part of your treatment and safety. Be sure to make and go to all appointments, and call your doctor if you are having problems. It's also a good idea to know your test results and keep a list of the medicines you take. How can you care for yourself at home? · Wear the sling day and night for as long as your doctor tells you to. You may take off the sling when you bathe. When the sling is off, avoid arm positions or motions that cause or increase pain. · Put ice or a cold pack on your collarbone for 10 to 20 minutes at a time. Try to do this every 1 to 2 hours for the next 3 days (when you are awake) or until the swelling goes down. Put a thin cloth between the ice and your skin. · Be safe with medicines. Take pain medicines exactly as directed. ¨ If the doctor gave you a prescription medicine for pain, take it as prescribed. ¨ If you are not taking a prescription pain medicine, ask your doctor if you can take an over-the-counter medicine. ¨ Do not take two or more pain medicines at the same time unless the doctor told you to. Many pain medicines have acetaminophen, which is Tylenol. Too much acetaminophen (Tylenol) can be harmful. · Try sleeping with pillows propped under your arm for comfort. · After a few days, put your fingers, wrist, and elbow through their full range of motion several times a day.  This will keep them from getting stiff. You may get instructions on rehabilitation exercises you can do when your shoulder starts to heal.  · You may use warm packs after the first 3 days for 15 to 20 minutes at a time to ease pain. · You may notice a bump where the collarbone is broken. Over time, the bump will get smaller. A small bump may remain, but it should not affect your arm's strength or movement. When should you call for help? Call your doctor now or seek immediate medical care if:  · Your fingers become numb, tingly, cool, or pale. · You cannot move your arm. Watch closely for changes in your health, and be sure to contact your doctor if:  · You have increased pain and swelling. · You do not get better as expected. Where can you learn more? Go to http://elvia-arden.info/. Enter P186 in the search box to learn more about \"Broken Collarbone: Care Instructions. \"  Current as of: May 23, 2016  Content Version: 11.2  © 5430-1237 [employer]. Care instructions adapted under license by [employer] (which disclaims liability or warranty for this information). If you have questions about a medical condition or this instruction, always ask your healthcare professional. [address] any warranty or liability for your use of this information.

## 2017-06-16 NOTE — MR AVS SNAPSHOT
Visit Information Date & Time Provider Department Dept. Phone Encounter #  
 6/16/2017  3:40 PM Bonnie Barrios, [address] [facility] [place] [phone] Follow-up Instructions Return in about 3 weeks (around 7/7/2017). Your Appointments 6/20/2017 10:40 AM  
ROUTINE CARE with MD Mikey Merlos ([facility]) Appt Note: med refill [address] [address] 2 [place] [address] [address] Upcoming Health Maintenance Date Due  
 GLAUCOMA SCREENING Q2Y 2/3/2015 ZOSTER VACCINE AGE 60> 8/24/2017* INFLUENZA AGE 9 TO ADULT 8/1/2017 MEDICARE YEARLY EXAM 3/7/2018 Pneumococcal 65+ Low/Medium Risk (2 of 2 - PPSV23) 7/12/2019 COLONOSCOPY 2/28/2021 DTaP/Tdap/Td series (3 - Td) 2/28/2026 *Topic was postponed. The date shown is not the original due date. Allergies as of 6/16/2017  Review Complete On: 6/16/2017 By: Wilder Berg Severity Noted Reaction Type Reactions Levaquin [Levofloxacin]  03/16/2011    Rash Other reaction(s): mild rash/itching Other Medication  12/10/2009    Other (comments) Bee stings PPD- skin test  
 Pcn [Penicillins]  02/22/2010    Unable to Obtain Other reaction(s): unknown Tuberculin, Old Skin Test  08/22/2014    Other (comments) Venom-honey Bee  08/22/2014 Other reaction(s): anaphylaxis/angioedema, swelling Current Immunizations  Reviewed on 9/10/2014 Name Date Influenza High Dose Vaccine PF 11/22/2016, 12/7/2015 Influenza Vaccine 9/10/2014, 3/24/2014 Influenza Vaccine Split 12/10/2009 Pneumococcal Conjugate (PCV-13) 12/7/2015 Pneumococcal Polysaccharide (PPSV-23) 7/12/2014  5:32 PM  
 Tdap 2/29/2016, 11/3/2011 Not reviewed this visit You Were Diagnosed With   
  
 Codes Comments Closed displaced fracture of acromial end of left clavicle, initial encounter    -  Primary ICD-10-CM: C54.109K ICD-9-CM: 810.03 Acute pain of left shoulder     ICD-10-CM: M25.512 ICD-9-CM: 719.41 Osteopenia determined by x-ray     ICD-10-CM: M85.80 ICD-9-CM: 733.90 Intellectual disability     ICD-10-CM: F79 
ICD-9-CM: 079 Hearing loss, unspecified hearing loss type, unspecified laterality     ICD-10-CM: H91.90 ICD-9-CM: 389.9 Seizure disorder ([address]     ICD-10-CM: G40.909 ICD-9-CM: 345.90 Vitals Weight(growth percentile) BMI Smoking Status 150 lb (68 kg) 24.21 kg/m2 Never Smoker BMI and BSA Data Body Mass Index Body Surface Area  
 24.21 kg/m 2 1.78 m 2 Preferred Pharmacy Pharmacy Name Phone [facility] [address] [address] [facility] 2/3 [phone] Your Updated Medication List  
  
   
This list is accurate as of: 6/16/17  4:32 PM.  Always use your most recent med list.  
  
  
  
  
 alendronate 70 mg tablet Commonly known as:  FOSAMAX Take 1 tabPO once weekly 30 min BEFORE any food of the day  with full glass H20,DON'T LAY DOWN for 30MIN AFTER med taken. calcium-cholecalciferol (D3) tablet Commonly known as:  CALTRATE 600+D TAKE 1 TABLET BY MOUTH TWICE DAILY CEROVITE SENIOR Tab tablet Generic drug:  multivitamins-minerals-lutein TAKE 1 TABLET BY MOUTH ONCE DAILY docusate sodium 100 mg capsule Commonly known as:  Josephine Oak Take 1 Cap by mouth nightly as needed for Constipation. EPINEPHrine 0.3 mg/0.3 mL injection Commonly known as:  EPIPEN  
0.3 mL by IntraMUSCular route once as needed. felbamate 600 mg tablet Commonly known as:  Alvester Dills Take 1 Tab by mouth four (4) times daily. fluticasone 50 mcg/actuation nasal spray Commonly known as:  [place] Mad 2 Sprays by Both Nostrils route daily. food supplemt, lactose-reduced Liqd Commonly known as:  ENSURE Take 237 mL by mouth four (4) times daily. levothyroxine 88 mcg tablet Commonly known as:  SYNTHROID  
TAKE 1 TABLET BY MOUTH DAILY BEFORE BREAKFAST  
  
 loperamide 2 mg tablet Commonly known as:  IMMODIUM Take 2 mg by mouth four (4) times daily as needed for Diarrhea.  
  
 loratadine 10 mg tablet Commonly known as:  CLARITIN  
TAKE 1 TABLET BY MOUTH ONCE DAILY LORazepam 0.5 mg tablet Commonly known as:  ATIVAN Take  by mouth nightly. * megestrol 40 mg tablet Commonly known as:  MEGACE  
TAKE 1 TABLET BY MOUTH ONCE DAILY * megestrol 40 mg tablet Commonly known as:  MEGACE  
TAKE 1 TABLET BY MOUTH ONCE DAILY  
  
 metoprolol tartrate 50 mg tablet Commonly known as:  LOPRESSOR  
TAKE 1 TABLET BY MOUTH TWICE DAILY  
  
 mirtazapine 30 mg tablet Commonly known as:  Kateryna Nailer Take 30 mg by mouth two (2) times a day. multivit-min-FA-lycopen-lutein 0.4-300-250 mg-mcg-mcg Tab Commonly known as:  CENTRUM SILVER Take 1 Tab by mouth daily. polyethylene glycol 17 gram packet Commonly known as:  Clora Oddi Take 1 Packet by mouth daily as needed. TOPAMAX 200 mg tablet Generic drug:  topiramate Take 200 mg by mouth two (2) times a day. VIMPAT 200 mg Tab tablet Generic drug:  lacosamide Take 200 mg by mouth two (2) times a day. vitamin a & d ointment Commonly known as:  A&D Apply  to affected area as needed. * Notice: This list has 2 medication(s) that are the same as other medications prescribed for you. Read the directions carefully, and ask your doctor or other care provider to review them with you. Follow-up Instructions Return in about 3 weeks (around 7/7/2017). Patient Instructions Broken Collarbone: Care Instructions Your Care Instructions You have broken or cracked your collarbone, or clavicle.  The collarbone is the long, slightly curved bone that connects the shoulder to the chest. It supports the shoulder. A broken collarbone may take 6 weeks or longer to heal. You will need to wear an arm sling to keep the broken bone from moving while it heals. At first, it may hurt to move your arm. This will get better with time. You heal best when you take good care of yourself. Eat a variety of healthy foods, and don't smoke. Follow-up care is a key part of your treatment and safety. Be sure to make and go to all appointments, and call your doctor if you are having problems. It's also a good idea to know your test results and keep a list of the medicines you take. How can you care for yourself at home? · Wear the sling day and night for as long as your doctor tells you to. You may take off the sling when you bathe. When the sling is off, avoid arm positions or motions that cause or increase pain. · Put ice or a cold pack on your collarbone for 10 to 20 minutes at a time. Try to do this every 1 to 2 hours for the next 3 days (when you are awake) or until the swelling goes down. Put a thin cloth between the ice and your skin. · Be safe with medicines. Take pain medicines exactly as directed. ¨ If the doctor gave you a prescription medicine for pain, take it as prescribed. ¨ If you are not taking a prescription pain medicine, ask your doctor if you can take an over-the-counter medicine. ¨ Do not take two or more pain medicines at the same time unless the doctor told you to. Many pain medicines have acetaminophen, which is Tylenol. Too much acetaminophen (Tylenol) can be harmful. · Try sleeping with pillows propped under your arm for comfort. · After a few days, put your fingers, wrist, and elbow through their full range of motion several times a day. This will keep them from getting stiff.  You may get instructions on rehabilitation exercises you can do when your shoulder starts to heal. 
 · You may use warm packs after the first 3 days for 15 to 20 minutes at a time to ease pain. · You may notice a bump where the collarbone is broken. Over time, the bump will get smaller. A small bump may remain, but it should not affect your arm's strength or movement. When should you call for help? Call your doctor now or seek immediate medical care if: 
· Your fingers become numb, tingly, cool, or pale. · You cannot move your arm. Watch closely for changes in your health, and be sure to contact your doctor if: 
· You have increased pain and swelling. · You do not get better as expected. Where can you learn more? Go to http://elvia-arden.info/. Enter P186 in the search box to learn more about \"Broken Collarbone: Care Instructions. \" Current as of: May 23, 2016 Content Version: 11.2 © 4475-1356 [email or web address]. Care instructions adapted under license by [employer] (which disclaims liability or warranty for this information). If you have questions about a medical condition or this instruction, always ask your healthcare professional. [place] [address] any warranty or liability for your use of this information. Introducing [facility] & HEALTH SERVICES! Dear Nayana Thompson: 
Thank you for requesting a [employer] account. Our records indicate that you already have an active [employer] account. You can access your account anytime at [email or web address] Did you know that you can access your hospital and ER discharge instructions at any time in [employer]? You can also review all of your test results from your hospital stay or ER visit. Additional Information If you have questions, please visit the Frequently Asked Questions section of the [employer] website at [email or web address] Remember, [employer] is NOT to be used for urgent needs. For medical emergencies, dial 911. Now available from your iPhone and Android! Please provide this summary of care documentation to your next provider. Your primary care clinician is listed as [address] [facility]. If you have any questions after today's visit, please call [phone].

## 2017-06-20 ENCOUNTER — OFFICE VISIT (OUTPATIENT)
Dept: FAMILY MEDICINE CLINIC | Age: 67
End: 2017-06-20

## 2017-06-20 VITALS
HEART RATE: 51 BPM | TEMPERATURE: 98.5 F | OXYGEN SATURATION: 97 % | SYSTOLIC BLOOD PRESSURE: 108 MMHG | RESPIRATION RATE: 16 BRPM | HEIGHT: 66 IN | DIASTOLIC BLOOD PRESSURE: 62 MMHG

## 2017-06-20 DIAGNOSIS — S42.002A CLOSED NONDISPLACED FRACTURE OF LEFT CLAVICLE, UNSPECIFIED PART OF CLAVICLE, INITIAL ENCOUNTER: Primary | ICD-10-CM

## 2017-06-20 RX ORDER — FLUTICASONE PROPIONATE 50 MCG
2 SPRAY, SUSPENSION (ML) NASAL DAILY
Qty: 1 BOTTLE | Refills: 6 | Status: SHIPPED | OUTPATIENT
Start: 2017-06-20 | End: 2017-07-15

## 2017-06-20 RX ORDER — ETHYL ALCOHOL 70 %
SOLUTION, NON-ORAL TOPICAL AS NEEDED
Qty: 60 G | Refills: 6 | Status: SHIPPED | OUTPATIENT
Start: 2017-06-20 | End: 2018-10-09 | Stop reason: SDUPTHER

## 2017-06-20 NOTE — MR AVS SNAPSHOT
Visit Information Date & Time Provider Department Dept. Phone Encounter #  
 6/20/2017 10:40 AM Nithin Sheldon MD [address] [id number] Follow-up Instructions Return in about 4 months (around 10/20/2017), or if symptoms worsen or fail to improve, for thyroid. Your Appointments 7/10/2017  1:00 PM  
Follow Up with Franny Zeng PA-C  
[facility] - [facility]) Appt Note: 3 WK FU COLLAR BONE FX  
 [address], [address] [facility] [address]  
[phone]  
  
   
 [address] [facility] [facility] [address] Upcoming Health Maintenance Date Due  
 GLAUCOMA SCREENING Q2Y 2/3/2015 ZOSTER VACCINE AGE 60> 8/24/2017* INFLUENZA AGE 9 TO ADULT 8/1/2017 MEDICARE YEARLY EXAM 3/7/2018 Pneumococcal 65+ Low/Medium Risk (2 of 2 - PPSV23) 7/12/2019 COLONOSCOPY 2/28/2021 DTaP/Tdap/Td series (3 - Td) 2/28/2026 *Topic was postponed. The date shown is not the original due date. Allergies as of 6/20/2017  Review Complete On: 6/20/2017 By: Talat Clay LPN Severity Noted Reaction Type Reactions Levaquin [Levofloxacin]  03/16/2011    Rash Other reaction(s): mild rash/itching Other Medication  12/10/2009    Other (comments) Bee stings PPD- skin test  
 Pcn [Penicillins]  02/22/2010    Unable to Obtain Other reaction(s): unknown Tuberculin, Old Skin Test  08/22/2014    Other (comments) Venom-honey Bee  08/22/2014 Other reaction(s): anaphylaxis/angioedema, swelling Current Immunizations  Reviewed on 9/10/2014 Name Date Influenza High Dose Vaccine PF 11/22/2016, 12/7/2015 Influenza Vaccine 9/10/2014, 3/24/2014 Influenza Vaccine Split 12/10/2009 Pneumococcal Conjugate (PCV-13) 12/7/2015 Pneumococcal Polysaccharide (PPSV-23) 7/12/2014  5:32 PM  
 Tdap 2/29/2016, 11/3/2011 Not reviewed this visit You Were Diagnosed With   
  
 Codes Comments Closed nondisplaced fracture of left clavicle, unspecified part of clavicle, initial encounter    -  Primary ICD-10-CM: V67.056K ICD-9-CM: 810.00 Vitals BP Pulse Temp Resp Height(growth percentile) SpO2  
 108/62 (BP 1 Location: Right arm, BP Patient Position: Sitting) (!) 51 98.5 °F (36.9 °C) (Oral) 16 5' 6\" (1.676 m) 97% Smoking Status Never Smoker Vitals History Preferred Pharmacy Pharmacy Name Phone [facility] [address] [facility] for iPhone and Android [address] [facility] 2/3 [phone] Your Updated Medication List  
  
   
This list is accurate as of: 6/20/17 11:42 AM.  Always use your most recent med list.  
  
  
  
  
 acetaminophen 500 mg tablet Commonly known as:  TYLENOL Take 1 Tab by mouth every six (6) hours as needed for Pain. alendronate 70 mg tablet Commonly known as:  FOSAMAX Take 1 tabPO once weekly 30 min BEFORE any food of the day  with full glass H20,DON'T LAY DOWN for 30MIN AFTER med taken. calcium-cholecalciferol (D3) tablet Commonly known as:  CALTRATE 600+D TAKE 1 TABLET BY MOUTH TWICE DAILY CEROVITE SENIOR Tab tablet Generic drug:  multivitamins-minerals-lutein TAKE 1 TABLET BY MOUTH ONCE DAILY docusate sodium 100 mg capsule Commonly known as:  Daniel Shiva Take 1 Cap by mouth nightly as needed for Constipation. EPINEPHrine 0.3 mg/0.3 mL injection Commonly known as:  EPIPEN  
0.3 mL by IntraMUSCular route once as needed. felbamate 600 mg tablet Commonly known as:  Ofelia Acres Take 1 Tab by mouth four (4) times daily. fluticasone 50 mcg/actuation nasal spray Commonly known as:  [place]ales Copa 2 Sprays by Both Nostrils route daily. food supplemt, lactose-reduced Liqd Commonly known as:  ENSURE Take 237 mL by mouth four (4) times daily. levothyroxine 88 mcg tablet Commonly known as:  SYNTHROID  
 TAKE 1 TABLET BY MOUTH DAILY BEFORE BREAKFAST  
  
 loperamide 2 mg tablet Commonly known as:  IMMODIUM Take 2 mg by mouth four (4) times daily as needed for Diarrhea.  
  
 loratadine 10 mg tablet Commonly known as:  CLARITIN  
TAKE 1 TABLET BY MOUTH ONCE DAILY LORazepam 0.5 mg tablet Commonly known as:  ATIVAN Take  by mouth nightly. megestrol 40 mg tablet Commonly known as:  MEGACE  
TAKE 1 TABLET BY MOUTH ONCE DAILY  
  
 metoprolol tartrate 50 mg tablet Commonly known as:  LOPRESSOR  
TAKE 1 TABLET BY MOUTH TWICE DAILY  
  
 mirtazapine 30 mg tablet Commonly known as:  Melvin Mech Take 30 mg by mouth two (2) times a day. multivit-min-FA-lycopen-lutein 0.4-300-250 mg-mcg-mcg Tab Commonly known as:  CENTRUM SILVER Take 1 Tab by mouth daily. polyethylene glycol 17 gram packet Commonly known as:  Artice Daunt Take 1 Packet by mouth daily as needed. TOPAMAX 200 mg tablet Generic drug:  topiramate Take 200 mg by mouth two (2) times a day. VIMPAT 200 mg Tab tablet Generic drug:  lacosamide Take 200 mg by mouth two (2) times a day. vitamin a & d ointment Commonly known as:  A&D Apply  to affected area as needed. Prescriptions Sent to Pharmacy Refills  
 vitamin a & d (A&D) ointment 6 Sig: Apply  to affected area as needed. Class: Normal  
 Pharmacy: [address] 2/3 Ph #: [phone] Route: Topical  
 fluticasone (FLONASE) 50 mcg/actuation nasal spray 6 Si Sprays by Both Nostrils route daily. Class: Normal  
 Pharmacy: [address] 2/3 Ph #: [phone] Route: Both Nostrils Follow-up Instructions Return in about 4 months (around 10/20/2017), or if symptoms worsen or fail to improve, for thyroid. Patient Instructions Berny Peck: Care Instructions Your Care Instructions You have broken or cracked your collarbone, or clavicle. The collarbone is the long, slightly curved bone that connects the shoulder to the chest. It supports the shoulder. A broken collarbone may take 6 weeks or longer to heal. You will need to wear an arm sling to keep the broken bone from moving while it heals. At first, it may hurt to move your arm. This will get better with time. You heal best when you take good care of yourself. Eat a variety of healthy foods, and don't smoke. Follow-up care is a key part of your treatment and safety. Be sure to make and go to all appointments, and call your doctor if you are having problems. It's also a good idea to know your test results and keep a list of the medicines you take. How can you care for yourself at home? · Wear the sling day and night for as long as your doctor tells you to. You may take off the sling when you bathe. When the sling is off, avoid arm positions or motions that cause or increase pain. · Put ice or a cold pack on your collarbone for 10 to 20 minutes at a time. Try to do this every 1 to 2 hours for the next 3 days (when you are awake) or until the swelling goes down. Put a thin cloth between the ice and your skin. · Be safe with medicines. Take pain medicines exactly as directed. ¨ If the doctor gave you a prescription medicine for pain, take it as prescribed. ¨ If you are not taking a prescription pain medicine, ask your doctor if you can take an over-the-counter medicine. ¨ Do not take two or more pain medicines at the same time unless the doctor told you to. Many pain medicines have acetaminophen, which is Tylenol. Too much acetaminophen (Tylenol) can be harmful. · Try sleeping with pillows propped under your arm for comfort. · After a few days, put your fingers, wrist, and elbow through their full range of motion several times a day.  This will keep them from getting stiff. You may get instructions on rehabilitation exercises you can do when your shoulder starts to heal. 
· You may use warm packs after the first 3 days for 15 to 20 minutes at a time to ease pain. · You may notice a bump where the collarbone is broken. Over time, the bump will get smaller. A small bump may remain, but it should not affect your arm's strength or movement. When should you call for help? Call your doctor now or seek immediate medical care if: 
· Your fingers become numb, tingly, cool, or pale. · You cannot move your arm. Watch closely for changes in your health, and be sure to contact your doctor if: 
· You have increased pain and swelling. · You do not get better as expected. Where can you learn more? Go to http://elvia-arden.info/. Enter P186 in the search box to learn more about \"Broken Collarbone: Care Instructions. \" Current as of: March 21, 2017 Content Version: 11.3 © 0832-6949 [employer]de[employer]. Care instructions adapted under license by [employer] (which disclaims liability or warranty for this information). If you have questions about a medical condition or this instruction, always ask your healthcare professional. [place] [address] any warranty or liability for your use of this information. Introducing [facility] & HEALTH SERVICES! Dear Manuela Harris: 
Thank you for requesting a [employer] account. Our records indicate that you already have an active [employer] account. You can access your account anytime at [email or web address] Flexion/"Qnect, llc" Did you know that you can access your hospital and ER discharge instructions at any time in [employer]? You can also review all of your test results from your hospital stay or ER visit. Additional Information If you have questions, please visit the Frequently Asked Questions section of the [employer] website at [email or web address] Flexion/"Qnect, llc"/. Remember, MyChart is NOT to be used for urgent needs. For medical emergencies, dial 911. Now available from your iPhone and Android! Please provide this summary of care documentation to your next provider. Your primary care clinician is listed as [address]. If you have any questions after today's visit, please call [phone].

## 2017-06-20 NOTE — PROGRESS NOTES
1. Have you been to the ER, urgent care clinic since your last visit? Hospitalized since your last visit? Yes Where: [facility]    2. Have you seen or consulted any other health care providers outside of the [address] [place] [address] since your last visit? Include any pap smears or colon screening.  No

## 2017-06-20 NOTE — PATIENT INSTRUCTIONS
Broken Collarbone: Care Instructions  Your Care Instructions    You have broken or cracked your collarbone, or clavicle. The collarbone is the long, slightly curved bone that connects the shoulder to the chest. It supports the shoulder. A broken collarbone may take 6 weeks or longer to heal. You will need to wear an arm sling to keep the broken bone from moving while it heals. At first, it may hurt to move your arm. This will get better with time. You heal best when you take good care of yourself. Eat a variety of healthy foods, and don't smoke. Follow-up care is a key part of your treatment and safety. Be sure to make and go to all appointments, and call your doctor if you are having problems. It's also a good idea to know your test results and keep a list of the medicines you take. How can you care for yourself at home? · Wear the sling day and night for as long as your doctor tells you to. You may take off the sling when you bathe. When the sling is off, avoid arm positions or motions that cause or increase pain. · Put ice or a cold pack on your collarbone for 10 to 20 minutes at a time. Try to do this every 1 to 2 hours for the next 3 days (when you are awake) or until the swelling goes down. Put a thin cloth between the ice and your skin. · Be safe with medicines. Take pain medicines exactly as directed. ¨ If the doctor gave you a prescription medicine for pain, take it as prescribed. ¨ If you are not taking a prescription pain medicine, ask your doctor if you can take an over-the-counter medicine. ¨ Do not take two or more pain medicines at the same time unless the doctor told you to. Many pain medicines have acetaminophen, which is Tylenol. Too much acetaminophen (Tylenol) can be harmful. · Try sleeping with pillows propped under your arm for comfort. · After a few days, put your fingers, wrist, and elbow through their full range of motion several times a day.  This will keep them from getting stiff. You may get instructions on rehabilitation exercises you can do when your shoulder starts to heal.  · You may use warm packs after the first 3 days for 15 to 20 minutes at a time to ease pain. · You may notice a bump where the collarbone is broken. Over time, the bump will get smaller. A small bump may remain, but it should not affect your arm's strength or movement. When should you call for help? Call your doctor now or seek immediate medical care if:  · Your fingers become numb, tingly, cool, or pale. · You cannot move your arm. Watch closely for changes in your health, and be sure to contact your doctor if:  · You have increased pain and swelling. · You do not get better as expected. Where can you learn more? Go to http://elvia-arden.info/. Enter P186 in the search box to learn more about \"Broken Collarbone: Care Instructions. \"  Current as of: March 21, 2017  Content Version: 11.3  © 7160-8949 [employer]. Care instructions adapted under license by [employer] (which disclaims liability or warranty for this information). If you have questions about a medical condition or this instruction, always ask your healthcare professional. [address] any warranty or liability for your use of this information.

## 2017-06-20 NOTE — PROGRESS NOTES
HISTORY OF PRESENT ILLNESS  Markell Perez is a 79 y.o. male. HPI  Patient is here today for follow up on: Clavicle Fracture; pt is mentally hallenged and is a [facility] resident    Clavicle Fracture: Pt fell about 2 weeks ago. He was at a cookout and broke his clavicle. States about a week later he developed a bruise . He was seen at the ED and placed in a sling. His counselor is unsure if he has seen ortho. Pt has been prescribed tylenol but he has not had much pain; He is using his arms; Needs a refill on flonase and vitamin A and D ointment. Current Outpatient Prescriptions:     acetaminophen (TYLENOL) 500 mg tablet, Take 1 Tab by mouth every six (6) hours as needed for Pain., Disp: 60 Tab, Rfl: 3    alendronate (FOSAMAX) 70 mg tablet, Take 1 tabPO once weekly 30 min BEFORE any food of the day  with full glass H20,DON'T LAY DOWN for 30MIN AFTER med taken. , Disp: 4 Tab, Rfl: 6    metoprolol tartrate (LOPRESSOR) 50 mg tablet, TAKE 1 TABLET BY MOUTH TWICE DAILY, Disp: 60 Tab, Rfl: 6    loratadine (CLARITIN) 10 mg tablet, TAKE 1 TABLET BY MOUTH ONCE DAILY, Disp: 30 Tab, Rfl: 6    megestrol (MEGACE) 40 mg tablet, TAKE 1 TABLET BY MOUTH ONCE DAILY, Disp: 30 Tab, Rfl: 6    multivit-min-FA-lycopen-lutein (CENTRUM SILVER) 0.4-300-250 mg-mcg-mcg tab, Take 1 Tab by mouth daily. , Disp: 30 Tab, Rfl: 11    calcium-cholecalciferol, D3, (CALTRATE 600+D) tablet, TAKE 1 TABLET BY MOUTH TWICE DAILY, Disp: 60 Tab, Rfl: 11    levothyroxine (SYNTHROID) 88 mcg tablet, TAKE 1 TABLET BY MOUTH DAILY BEFORE BREAKFAST, Disp: 30 Tab, Rfl: 6    EPINEPHrine (EPIPEN) 0.3 mg/0.3 mL injection, 0.3 mL by IntraMUSCular route once as needed. , Disp: 1 mL, Rfl: 3    polyethylene glycol (MIRALAX) 17 gram packet, Take 1 Packet by mouth daily as needed. , Disp: 30 Each, Rfl: 6    vitamin a & d (A&D) ointment, Apply  to affected area as needed. , Disp: 60 g, Rfl: 6    docusate sodium (COLACE) 100 mg capsule, Take 1 Cap by mouth nightly as needed for Constipation. , Disp: 90 Cap, Rfl: 2    fluticasone (FLONASE) 50 mcg/actuation nasal spray, 2 Sprays by Both Nostrils route daily. , Disp: 1 Bottle, Rfl: 6    LORazepam (ATIVAN) 0.5 mg tablet, Take  by mouth nightly., Disp: , Rfl:     mirtazapine (REMERON) 30 mg tablet, Take 30 mg by mouth two (2) times a day., Disp: , Rfl:     food supplement, lactose-free (ENSURE) liqd, Take 237 mL by mouth four (4) times daily. , Disp: 1000 mL, Rfl: 3    lacosamide (VIMPAT) 200 mg Tab tablet, Take 200 mg by mouth two (2) times a day., Disp: , Rfl:     felbamate (FELBATOL) 600 mg tablet, Take 1 Tab by mouth four (4) times daily. , Disp: 120 Tab, Rfl: 6    topiramate (TOPAMAX) 200 mg tablet, Take 200 mg by mouth two (2) times a day., Disp: , Rfl:     CEROVITE SENIOR tab tablet, TAKE 1 TABLET BY MOUTH ONCE DAILY, Disp: 30 Tab, Rfl: 11    loperamide (IMMODIUM) 2 mg tablet, Take 2 mg by mouth four (4) times daily as needed for Diarrhea., Disp: , Rfl:     Review of Systems   Constitutional: Negative for chills and fever. Musculoskeletal: Positive for falls. Negative for joint pain, myalgias and neck pain. Physical Exam   Constitutional: He appears well-developed and well-nourished. No distress. Cardiovascular: Normal rate and regular rhythm. Pulmonary/Chest: No respiratory distress. He has no wheezes. Musculoskeletal: He exhibits no tenderness (at leftclavicle; + bruising on the superior aspect of the left shoulder area). Nursing note and vitals reviewed.      Visit Vitals    /62 (BP 1 Location: Right arm, BP Patient Position: Sitting)    Pulse (!) 51    Temp 98.5 °F (36.9 °C) (Oral)    Resp 16    Ht 5' 6\" (1.676 m)    SpO2 97%         ASSESSMENT and PLAN    ICD-10-CM ICD-9-CM    1. Closed nondisplaced fracture of left clavicle, unspecified part of clavicle, initial encounter S42.002A 810.00        As above, new, stable  Per chart review pt has been under ortho care;   Continue ortho plan  Follow-up Disposition:  Return in about 4 months (around 10/20/2017), or if symptoms worsen or fail to improve, for thyroid. An After Visit Summary was printed and given to the patient. This has been fully explained to the patient, who indicates understanding.

## 2017-07-17 ENCOUNTER — OFFICE VISIT (OUTPATIENT)
Dept: ORTHOPEDIC SURGERY | Facility: CLINIC | Age: 67
End: 2017-07-17

## 2017-07-17 VITALS — HEART RATE: 52 BPM | DIASTOLIC BLOOD PRESSURE: 57 MMHG | SYSTOLIC BLOOD PRESSURE: 96 MMHG | HEIGHT: 66 IN

## 2017-07-17 DIAGNOSIS — F79 INTELLECTUAL DISABILITY: ICD-10-CM

## 2017-07-17 DIAGNOSIS — M85.80 OSTEOPENIA DETERMINED BY X-RAY: ICD-10-CM

## 2017-07-17 DIAGNOSIS — S62.114A CLOSED NONDISPLACED FRACTURE OF TRIQUETRUM OF RIGHT WRIST, INITIAL ENCOUNTER: Primary | ICD-10-CM

## 2017-07-17 DIAGNOSIS — S42.032D CLOSED DISPLACED FRACTURE OF ACROMIAL END OF LEFT CLAVICLE WITH ROUTINE HEALING, SUBSEQUENT ENCOUNTER: ICD-10-CM

## 2017-07-17 DIAGNOSIS — S42.032A CLOSED DISPLACED FRACTURE OF ACROMIAL END OF LEFT CLAVICLE, INITIAL ENCOUNTER: Primary | ICD-10-CM

## 2017-07-17 DIAGNOSIS — M25.531 ACUTE PAIN OF RIGHT WRIST: ICD-10-CM

## 2017-07-17 NOTE — MR AVS SNAPSHOT
Visit Information Date & Time Provider Department Dept. Phone Encounter #  
 7/17/2017  2:30 PM Yajaira Rizo MD [facility] [phone] Follow-up Instructions Return in about 1 month (around 8/17/2017). Follow-up and Disposition History Your Appointments 10/16/2017  3:00 PM  
ROUTINE CARE with Alecia Cortes MD  
[address]) Appt Note: 4 m fu thyroid [address] [address] [address] [place] [address] [address] Upcoming Health Maintenance Date Due  
 GLAUCOMA SCREENING Q2Y 2/3/2015 ZOSTER VACCINE AGE 60> 8/24/2017* INFLUENZA AGE 9 TO ADULT 8/1/2017 MEDICARE YEARLY EXAM 3/7/2018 Pneumococcal 65+ Low/Medium Risk (2 of 2 - PPSV23) 7/12/2019 COLONOSCOPY 2/28/2021 DTaP/Tdap/Td series (3 - Td) 2/28/2026 *Topic was postponed. The date shown is not the original due date. Allergies as of 7/17/2017  Review Complete On: 7/17/2017 By: Yajaira Rizo MD  
  
 Severity Noted Reaction Type Reactions Levaquin [Levofloxacin]  03/16/2011    Rash Other reaction(s): mild rash/itching Other Medication  12/10/2009    Other (comments) Bee stings PPD- skin test  
 Pcn [Penicillins]  02/22/2010    Unable to Obtain Other reaction(s): unknown Tuberculin, Old Skin Test  08/22/2014    Other (comments) Venom-honey Bee  08/22/2014 Other reaction(s): anaphylaxis/angioedema, swelling Current Immunizations  Reviewed on 9/10/2014 Name Date Influenza High Dose Vaccine PF 11/22/2016, 12/7/2015 Influenza Vaccine 9/10/2014, 3/24/2014 Influenza Vaccine Split 12/10/2009 Pneumococcal Conjugate (PCV-13) 12/7/2015 Pneumococcal Polysaccharide (PPSV-23) 7/12/2014  5:32 PM  
 Tdap 2/29/2016, 11/3/2011 Not reviewed this visit You Were Diagnosed With   
  
 Codes Comments Closed nondisplaced fracture of triquetrum of right wrist, initial encounter    -  Primary ICD-10-CM: W99.898A ICD-9-CM: 814.03 Closed displaced fracture of acromial end of left clavicle with routine healing, subsequent encounter     ICD-10-CM: S42.032D ICD-9-CM: V54.19 Intellectual disability     ICD-10-CM: F79 
ICD-9-CM: 675 Osteopenia determined by x-ray     ICD-10-CM: M85.80 ICD-9-CM: 733.90 Acute pain of right wrist     ICD-10-CM: M25.531 ICD-9-CM: 719.43 Vitals Smoking Status Never Smoker Preferred Pharmacy Pharmacy Name Phone [facility] [address] [address] [employer] [facility]/3 [phone] Your Updated Medication List  
  
   
This list is accurate as of: 7/17/17  3:11 PM.  Always use your most recent med list.  
  
  
  
  
 alendronate 70 mg tablet Commonly known as:  FOSAMAX Take 1 tabPO once weekly 30 min BEFORE any food of the day  with full glass H20,DON'T LAY DOWN for 30MIN AFTER med taken. calcium-cholecalciferol (D3) tablet Commonly known as:  CALTRATE 600+D TAKE 1 TABLET BY MOUTH TWICE DAILY docusate sodium 100 mg capsule Commonly known as:  Lylia Sella Take 1 Cap by mouth nightly as needed for Constipation. EPINEPHrine 0.3 mg/0.3 mL injection Commonly known as:  EPIPEN  
0.3 mL by IntraMUSCular route once as needed. felbamate 600 mg tablet Commonly known as:  Laci Yvonne Take 1 Tab by mouth four (4) times daily. food supplemt, lactose-reduced Liqd Commonly known as:  ENSURE Take 237 mL by mouth four (4) times daily. ibuprofen 600 mg tablet Commonly known as:  MOTRIN Take 1 Tab by mouth every six (6) hours as needed for Pain.  
  
 levothyroxine 88 mcg tablet Commonly known as:  SYNTHROID  
TAKE 1 TABLET BY MOUTH DAILY BEFORE BREAKFAST  
  
 loratadine 10 mg tablet Commonly known as:  CLARITIN  
TAKE 1 TABLET BY MOUTH ONCE DAILY LORazepam 0.5 mg tablet Commonly known as:  ATIVAN Take  by mouth nightly. megestrol 40 mg tablet Commonly known as:  MEGACE  
TAKE 1 TABLET BY MOUTH ONCE DAILY  
  
 metoprolol tartrate 50 mg tablet Commonly known as:  LOPRESSOR  
TAKE 1 TABLET BY MOUTH TWICE DAILY  
  
 mirtazapine 30 mg tablet Commonly known as:  Elizabeth Jacks Take 30 mg by mouth two (2) times a day. multivit-min-FA-lycopen-lutein 0.4-300-250 mg-mcg-mcg Tab Commonly known as:  CENTRUM SILVER Take 1 Tab by mouth daily. polyethylene glycol 17 gram packet Commonly known as:  Lawerance Amas Take 1 Packet by mouth daily as needed. TOPAMAX 200 mg tablet Generic drug:  topiramate Take 200 mg by mouth two (2) times a day. VIMPAT 200 mg Tab tablet Generic drug:  lacosamide Take 200 mg by mouth two (2) times a day. vitamin a & d ointment Commonly known as:  A&D Apply  to affected area as needed. We Performed the Following OR CLOSED RX CARPAL FX [id number] CPT(R)] WRIST SPLINT [AZC560 Custom] Comments:  
 RIGHT CARPAL TUNNEL SPLINT Follow-up Instructions Return in about 1 month (around 8/17/2017). Patient Instructions Broken Wrist: Care Instructions Your Care Instructions Your wrist can break, or fracture, during sports, a fall, or other accidents. The break may happen when your wrist is hit or is used to protect you in a fall. Fractures can range from a small, hairline crack, to a bone or bones broken into two or more pieces. Your treatment depends on how bad the break is. Your doctor may have put your wrist in a cast or splint. This will help keep your wrist stable until your follow-up appointment. It may take weeks or months for your wrist to heal. You can help it heal with care at home. You heal best when you take good care of yourself. Eat a variety of healthy foods, and don't smoke. Follow-up care is a key part of your treatment and safety. Be sure to make and go to all appointments, and call your doctor if you are having problems. It's also a good idea to know your test results and keep a list of the medicines you take. How can you care for yourself at home? · Put ice or a cold pack on your wrist for 10 to 20 minutes at a time. Try to do this every 1 to 2 hours for the next 3 days (when you are awake). Put a thin cloth between the ice and your cast or splint. Keep your cast or splint dry. · Follow the splint or cast care instructions your doctor gives you. If you have a splint, do not take it off unless your doctor tells you to. Be careful not to put the splint on too tight. · Be safe with medicines. Take pain medicines exactly as directed. ¨ If the doctor gave you a prescription medicine for pain, take it as prescribed. ¨ If you are not taking a prescription pain medicine, ask your doctor if you can take an over-the-counter medicine. · Prop up your wrist on pillows when you sit or lie down in the first few days after the injury. Keep your wrist higher than the level of your heart. This will help reduce swelling. · Move your fingers often to reduce swelling and stiffness, but do not use that hand to grab or carry anything. · Follow instructions for exercises to keep your arm strong. When should you call for help? Call your doctor now or seek immediate medical care if: 
· You have increased or severe pain. · Your cast or splint feels too tight. · You cannot move your fingers. · You have tingling, weakness, or numbness in your hand and fingers. · Your hand and fingers are cool or pale or change color. · You have a lot of swelling near your cast or splint. · The skin under your cast or splint is burning or stinging. Watch closely for changes in your health, and be sure to contact your doctor if: 
· You do not get better as expected. Where can you learn more? Go to http://elvia-arden.info/. Enter [id number] in the search box to learn more about \"Broken Wrist: Care Instructions. \" Current as of: March 21, 2017 Content Version: 11.3 © 5744-3222 [employer]. Care instructions adapted under license by [employer] (which disclaims liability or warranty for this information). If you have questions about a medical condition or this instruction, always ask your healthcare professional. [address] any warranty or liability for your use of this information. Patient Instructions History Introducing [facility]! Dear Mansoor Godinez: 
Thank you for requesting a [employer] account. Our records indicate that you already have an active [employer] account. You can access your account anytime at [email or web address]. [employer] Did you know that you can access your hospital and ER discharge instructions at any time in [employer]? You can also review all of your test results from your hospital stay or ER visit. Additional Information If you have questions, please visit the Frequently Asked Questions section of the [employer] website at [email or web address]/. Remember, [employer] is NOT to be used for urgent needs. For medical emergencies, dial 911. Now available from your iPhone and Android! Please provide this summary of care documentation to your next provider. Your primary care clinician is listed as [address]. If you have any questions after today's visit, please call [phone].

## 2017-07-17 NOTE — PATIENT INSTRUCTIONS
Broken Wrist: Care Instructions  Your Care Instructions    Your wrist can break, or fracture, during sports, a fall, or other accidents. The break may happen when your wrist is hit or is used to protect you in a fall. Fractures can range from a small, hairline crack, to a bone or bones broken into two or more pieces. Your treatment depends on how bad the break is. Your doctor may have put your wrist in a cast or splint. This will help keep your wrist stable until your follow-up appointment. It may take weeks or months for your wrist to heal. You can help it heal with care at home. You heal best when you take good care of yourself. Eat a variety of healthy foods, and don't smoke. Follow-up care is a key part of your treatment and safety. Be sure to make and go to all appointments, and call your doctor if you are having problems. It's also a good idea to know your test results and keep a list of the medicines you take. How can you care for yourself at home? · Put ice or a cold pack on your wrist for 10 to 20 minutes at a time. Try to do this every 1 to 2 hours for the next 3 days (when you are awake). Put a thin cloth between the ice and your cast or splint. Keep your cast or splint dry. · Follow the splint or cast care instructions your doctor gives you. If you have a splint, do not take it off unless your doctor tells you to. Be careful not to put the splint on too tight. · Be safe with medicines. Take pain medicines exactly as directed. ¨ If the doctor gave you a prescription medicine for pain, take it as prescribed. ¨ If you are not taking a prescription pain medicine, ask your doctor if you can take an over-the-counter medicine. · Prop up your wrist on pillows when you sit or lie down in the first few days after the injury. Keep your wrist higher than the level of your heart. This will help reduce swelling.   · Move your fingers often to reduce swelling and stiffness, but do not use that hand to grab or carry anything. · Follow instructions for exercises to keep your arm strong. When should you call for help? Call your doctor now or seek immediate medical care if:  · You have increased or severe pain. · Your cast or splint feels too tight. · You cannot move your fingers. · You have tingling, weakness, or numbness in your hand and fingers. · Your hand and fingers are cool or pale or change color. · You have a lot of swelling near your cast or splint. · The skin under your cast or splint is burning or stinging. Watch closely for changes in your health, and be sure to contact your doctor if:  · You do not get better as expected. Where can you learn more? Go to http://elvia-arden.info/. Enter [id number] in the search box to learn more about \"Broken Wrist: Care Instructions. \"  Current as of: March 21, 2017  Content Version: 11.3  © 5981-3521 [employer]. Care instructions adapted under license by [employer] (which disclaims liability or warranty for this information). If you have questions about a medical condition or this instruction, always ask your healthcare professional. [address] any warranty or liability for your use of this information.

## 2017-07-17 NOTE — PROGRESS NOTES
Patient: Hardeep Matta                MRN: [id number]       SSN: [id number]  YOB: 1950        AGE: 79 y.o. SEX: male    PCP: Yoni Barr MD  07/17/17    CC: RIGHT WRIST PAIN    HISTORY:  Hardeep Matta is a 79 y.o. male who is seen for right wrist pain. His caregiver states that he slipped out of his wheelchair on 7/14/17. He was seen in the ED on 7/15/17. He is also seen for a left collarbone fracture. Patient was seen in the ED yesterday, 6/15/17, complaining of left shoulder pain. Patient's caregiver states that he may have fallen 3 weeks ago. He also definitely fell on 4/27/17. History is unclear as to exact date of shoulder injury causing his acute clavicle fracture. Caregiver states that the staff is claiming that he did not fall. He denies any injury or trauma but is a poor historian due to severe autism. He has tried Ibuprofen as needed. Patient had an ankle fracture treated in a cast 7/21/16 by Dr. Giselle Bradford. He has been on Fosamax since 2006. He has not had a bone density scan since 2010. Presents to the office wearing a left arm sling and in a wheelchair. Pain Assessment  7/17/2017   Location of Pain Wrist   Location Modifiers Right   Severity of Pain 2   Quality of Pain -   Duration of Pain A few minutes   Frequency of Pain Intermittent   Limiting Behavior -   Relieving Factors -   Result of Injury Yes   Work-Related Injury No   Type of Injury Fall     Occupation, etc:  Mr. Virginie Harris is non communicative with intellectual disability. He is assisted by a caregiver, Gerhardt Ground. Dania Sofiya is a supervisor of [facility] where the patient is cared for. Patient's parents have passed away. He does still have a living sister who visits occasionally. Patient is wheelchair bound. Current weight is 110 pounds. He is 5'11\" tall. In 2011 he fell and had a major hematoma. Since then he has only been able to bed baths.   Patient was previously treated by me for an L1 compression. 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 History limited by severe autism--history given by caregiver. Constitutional: negative for fever, chills, and weight loss. Cardiovascular: negative for chest pain, claudication, leg swelling, SOB, BAXTER   Gastrointestinal: Negative for pain, N/V/C/D, Blood in stool or urine, dysuria,  hematuria, incontinence, pelvic pain. Musculoskeletal: See HPI   Neurological: Negative for dizziness and weakness. Negative for headaches, Visual changes, confusion, seizures   Phychiatric/Behavioral: Negative for depression, memory loss, substance  abuse. Extremities: Negative for hair changes, rash, or skin lesion changes. Hematologic: Negative for bleeding problems, bruising, pallor or swollen lymph  nodes   Peripheral Vascular: No calf pain, no circulation deficits. Social History     Social History    Marital status: SINGLE     Spouse name: N/A    Number of children: N/A    Years of education: N/A     Occupational History    Not on file.      Social History Main Topics    Smoking status: Never Smoker    Smokeless tobacco: Never Used    Alcohol use No    Drug use: No    Sexual activity: No     Other Topics Concern    Caffeine Concern Yes     1 cup day    Exercise Yes     patient tries to be active everyday   [place] Yes     Social History Narrative Allergies   Allergen Reactions    Levaquin [Levofloxacin] Rash     Other reaction(s): mild rash/itching    Other Medication Other (comments)     Bee stings  PPD- skin test    Pcn [Penicillins] Unable to Obtain     Other reaction(s): unknown    Tuberculin, Old Skin Test Other (comments)    Venom-Honey Bee      Other reaction(s): anaphylaxis/angioedema, swelling      Current Outpatient Prescriptions   Medication Sig    ibuprofen (MOTRIN) 600 mg tablet Take 1 Tab by mouth every six (6) hours as needed for Pain.  vitamin a & d (A&D) ointment Apply  to affected area as needed.  alendronate (FOSAMAX) 70 mg tablet Take 1 tabPO once weekly 30 min BEFORE any food of the day  with full glass H20,DON'T LAY DOWN for 30MIN AFTER med taken.  metoprolol tartrate (LOPRESSOR) 50 mg tablet TAKE 1 TABLET BY MOUTH TWICE DAILY    loratadine (CLARITIN) 10 mg tablet TAKE 1 TABLET BY MOUTH ONCE DAILY    megestrol (MEGACE) 40 mg tablet TAKE 1 TABLET BY MOUTH ONCE DAILY    multivit-min-FA-lycopen-lutein (CENTRUM SILVER) 0.4-300-250 mg-mcg-mcg tab Take 1 Tab by mouth daily.  calcium-cholecalciferol, D3, (CALTRATE 600+D) tablet TAKE 1 TABLET BY MOUTH TWICE DAILY    levothyroxine (SYNTHROID) 88 mcg tablet TAKE 1 TABLET BY MOUTH DAILY BEFORE BREAKFAST    EPINEPHrine (EPIPEN) 0.3 mg/0.3 mL injection 0.3 mL by IntraMUSCular route once as needed.  polyethylene glycol (MIRALAX) 17 gram packet Take 1 Packet by mouth daily as needed.  docusate sodium (COLACE) 100 mg capsule Take 1 Cap by mouth nightly as needed for Constipation.  LORazepam (ATIVAN) 0.5 mg tablet Take  by mouth nightly.  mirtazapine (REMERON) 30 mg tablet Take 30 mg by mouth two (2) times a day.  food supplement, lactose-free (ENSURE) liqd Take 237 mL by mouth four (4) times daily.  lacosamide (VIMPAT) 200 mg Tab tablet Take 200 mg by mouth two (2) times a day.  felbamate (FELBATOL) 600 mg tablet Take 1 Tab by mouth four (4) times daily.  topiramate (TOPAMAX) 200 mg tablet Take 200 mg by mouth two (2) times a day. No current facility-administered medications for this visit. PHYSICAL EXAMINATION:  There were no vitals taken for this visit. ORTHO EXAMINATION:  Examination Left shoulder   Skin Intact   Effusion -   Biceps deformity -   Atrophy +   AC joint tenderness +++   Acromial tenderness +   Biceps tenderness -   Forward flexion/Elevation    Active abduction    External rotation ROM 30   Internal rotation ROM 70   Apprehension na   Impingement na   Drop Arm Test na   Neurovascular Intact   Bruising, tenderness and swelling over distal clavicle fracture site  Examination Right Wrist Left Wrist   Skin Intact Intact   Tenderness +, carpus  -   Flexion 40 40   Extension 30 30   Deformity - -   Effusion - -   Finger flexion Full Full   Finger extension Full Full   Tinel's sign - -   Phalen's test - -   Finklestein maneuver - -   Pain with thumb abduction - -   Capillary refill - -     RADIOGRAPHS:  XR RIGHT WRIST 7/15/17  IMPRESSION: Acute triquetral avulsion fracture. XR OF LEFT CLAVICLE 6/15/17  IMPRESSION:  There is minimally displaced fracture involving the distal left clavicle. XR OF LEFT SHOULDER 6/15/17  IMPRESSION:  There is distal left clavicular fracture. No other fracture or dislocation is  seen.     IMPRESSION:      ICD-10-CM ICD-9-CM    1. Closed nondisplaced fracture of triquetrum of right wrist, initial encounter S62.114A 814.03 NC CLOSED RX CARPAL FX      WRIST SPLINT   2. Closed displaced fracture of acromial end of left clavicle with routine healing, subsequent encounter S42.032D V54.19    3. Intellectual disability F79 319    4. Osteopenia determined by x-ray M85.80 733.90    5. Acute pain of right wrist M25.531 719.43 WRIST SPLINT     PLAN:  He was provided with a wrist splint today. He will follow up in 4 weeks. He will follow up with his DEXA bone scan later as scheduled later this month. Scribed by Sarkis Mustafa ([facility]) as dictated by Lovey Kanner, MD

## 2017-07-24 RX ORDER — DOCUSATE SODIUM 100 MG/1
100 CAPSULE, LIQUID FILLED ORAL
Qty: 90 CAP | Refills: 2 | Status: SHIPPED | OUTPATIENT
Start: 2017-07-24 | End: 2018-07-09 | Stop reason: SDUPTHER

## 2017-07-27 ENCOUNTER — APPOINTMENT (OUTPATIENT)
Dept: PHYSICAL THERAPY | Age: 67
End: 2017-07-27

## 2017-08-14 ENCOUNTER — OFFICE VISIT (OUTPATIENT)
Dept: ORTHOPEDIC SURGERY | Facility: CLINIC | Age: 67
End: 2017-08-14

## 2017-08-14 DIAGNOSIS — S42.032D CLOSED DISPLACED FRACTURE OF ACROMIAL END OF LEFT CLAVICLE WITH ROUTINE HEALING, SUBSEQUENT ENCOUNTER: ICD-10-CM

## 2017-08-14 DIAGNOSIS — M62.50 MUSCULAR ATROPHY, UNSPECIFIED SITE: ICD-10-CM

## 2017-08-14 DIAGNOSIS — R29.898 WEAKNESS OF BOTH LEGS: ICD-10-CM

## 2017-08-14 DIAGNOSIS — R29.898 WEAKNESS OF BOTH ARMS: ICD-10-CM

## 2017-08-14 DIAGNOSIS — M25.531 RIGHT WRIST PAIN: Primary | ICD-10-CM

## 2017-08-14 DIAGNOSIS — R26.81 GENERALLY UNSTEADY: ICD-10-CM

## 2017-08-14 NOTE — MR AVS SNAPSHOT
Visit Information Date & Time Provider Department Dept. Phone Encounter #  
 8/14/2017  2:00 PM Nuria Lerma PA-C [facility] - [place] [phone] Follow-up Instructions Return in about 4 weeks (around 9/11/2017). Your Appointments 10/16/2017  3:00 PM  
ROUTINE CARE with Matt Booth MD  
[address] [facility]) Appt Note: 4 m fu thyroid [address] [address] 2 Hemant [place] [address] [address] Upcoming Health Maintenance Date Due  
 GLAUCOMA SCREENING Q2Y 2/3/2015 INFLUENZA AGE 9 TO ADULT 8/1/2017 ZOSTER VACCINE AGE 60> 8/24/2017* MEDICARE YEARLY EXAM 3/7/2018 Pneumococcal 65+ Low/Medium Risk (2 of 2 - PPSV23) 7/12/2019 COLONOSCOPY 2/28/2021 DTaP/Tdap/Td series (3 - Td) 2/28/2026 *Topic was postponed. The date shown is not the original due date. Allergies as of 8/14/2017  Review Complete On: 8/14/2017 By: Nuria Lerma PA-C Severity Noted Reaction Type Reactions Levaquin [Levofloxacin]  03/16/2011    Rash Other reaction(s): mild rash/itching Other Medication  12/10/2009    Other (comments) Bee stings PPD- skin test  
 Pcn [Penicillins]  02/22/2010    Unable to Obtain Other reaction(s): unknown Tuberculin, Old Skin Test  08/22/2014    Other (comments) Venom-honey Bee  08/22/2014 Other reaction(s): anaphylaxis/angioedema, swelling Current Immunizations  Reviewed on 9/10/2014 Name Date Influenza High Dose Vaccine PF 11/22/2016, 12/7/2015 Influenza Vaccine 9/10/2014, 3/24/2014 Influenza Vaccine Split 12/10/2009 Pneumococcal Conjugate (PCV-13) 12/7/2015 Pneumococcal Polysaccharide (PPSV-23) 7/12/2014  5:32 PM  
 Tdap 2/29/2016, 11/3/2011 Not reviewed this visit You Were Diagnosed With   
  
 Codes Comments Right wrist pain    -  Primary ICD-10-CM: M25.531 ICD-9-CM: 719.43 Closed displaced fracture of acromial end of left clavicle with routine healing, subsequent encounter     ICD-10-CM: S42.032D ICD-9-CM: V54.19 Muscular atrophy, unspecified site     ICD-10-CM: M62.50 ICD-9-CM: 728.2 Generally unsteady     ICD-10-CM: R26.81 
ICD-9-CM: 795. 2 Weakness of both arms     ICD-10-CM: R29.898 ICD-9-CM: 729.89 Weakness of both legs     ICD-10-CM: R29.898 ICD-9-CM: 729.89 Vitals Smoking Status Never Smoker Preferred Pharmacy Pharmacy Name Phone [facility] [address] [address] [facility] Day [facility] 2/3 [phone] Your Updated Medication List  
  
   
This list is accurate as of: 8/14/17  3:21 PM.  Always use your most recent med list.  
  
  
  
  
 alendronate 70 mg tablet Commonly known as:  FOSAMAX Take 1 tabPO once weekly 30 min BEFORE any food of the day  with full glass H20,DON'T LAY DOWN for 30MIN AFTER med taken. calcium-cholecalciferol (D3) tablet Commonly known as:  CALTRATE 600+D TAKE 1 TABLET BY MOUTH TWICE DAILY docusate sodium 100 mg capsule Commonly known as:  Peter Pian Take 1 Cap by mouth nightly as needed for Constipation. EPINEPHrine 0.3 mg/0.3 mL injection Commonly known as:  EPIPEN  
0.3 mL by IntraMUSCular route once as needed. felbamate 600 mg tablet Commonly known as:  Sellers Marchi Take 1 Tab by mouth four (4) times daily. food supplemt, lactose-reduced Liqd Commonly known as:  ENSURE Take 237 mL by mouth four (4) times daily. ibuprofen 600 mg tablet Commonly known as:  MOTRIN Take 1 Tab by mouth every six (6) hours as needed for Pain.  
  
 levothyroxine 88 mcg tablet Commonly known as:  SYNTHROID  
TAKE 1 TABLET BY MOUTH DAILY BEFORE BREAKFAST  
  
 loratadine 10 mg tablet Commonly known as:  CLARITIN  
TAKE 1 TABLET BY MOUTH ONCE DAILY LORazepam 0.5 mg tablet Commonly known as:  ATIVAN Take  by mouth nightly. megestrol 40 mg tablet Commonly known as:  MEGACE  
TAKE 1 TABLET BY MOUTH ONCE DAILY  
  
 metoprolol tartrate 50 mg tablet Commonly known as:  LOPRESSOR  
TAKE 1 TABLET BY MOUTH TWICE DAILY  
  
 mirtazapine 30 mg tablet Commonly known as:  Janene Mean Take 30 mg by mouth two (2) times a day. multivit-min-FA-lycopen-lutein 0.4-300-250 mg-mcg-mcg Tab Commonly known as:  CENTRUM SILVER Take 1 Tab by mouth daily. polyethylene glycol 17 gram packet Commonly known as:  Jeannie Plane Take 1 Packet by mouth daily as needed. TOPAMAX 200 mg tablet Generic drug:  topiramate Take 200 mg by mouth two (2) times a day. VIMPAT 200 mg Tab tablet Generic drug:  lacosamide Take 200 mg by mouth two (2) times a day. vitamin a & d ointment Commonly known as:  A&D Apply  to affected area as needed. We Performed the Following [address] [place] [address] Comments: PT bilateral upper and lower extremities weakness, strengthening,conditioning. Follow-up Instructions Return in about 4 weeks (around 9/11/2017). Referral Information Referral ID Referred By Referred To  
  
 [id number] Yaritza January Not Available Visits Status Start Date End Date 1 New Request 8/14/17 8/14/18 If your referral has a status of pending review or denied, additional information will be sent to support the outcome of this decision. Introducing [facility] HEALTH [facility]! Dear Bonnie Gr: 
Thank you for requesting a [employer] account. Our records indicate that you already have an active [employer] account. You can access your account anytime at [email or web address] Did you know that you can access your hospital and ER discharge instructions at any time in [employer]? You can also review all of your test results from your hospital stay or ER visit. Additional Information If you have questions, please visit the Frequently Asked Questions section of the [employer] website at [email or web address] Remember, [employer] is NOT to be used for urgent needs. For medical emergencies, dial 911. Now available from your iPhone and Android! Please provide this summary of care documentation to your next provider. Your primary care clinician is listed as [address]. If you have any questions after today's visit, please call [phone].

## 2017-08-23 RX ORDER — LEVOTHYROXINE SODIUM 88 UG/1
TABLET ORAL
Qty: 30 TAB | Refills: 6 | Status: SHIPPED | OUTPATIENT
Start: 2017-08-23 | End: 2018-03-12 | Stop reason: SDUPTHER

## 2017-09-01 ENCOUNTER — TELEPHONE (OUTPATIENT)
Dept: ORTHOPEDIC SURGERY | Facility: CLINIC | Age: 67
End: 2017-09-01

## 2017-09-01 DIAGNOSIS — R29.898 WEAKNESS OF BOTH LEGS: ICD-10-CM

## 2017-09-01 DIAGNOSIS — M62.50 MUSCULAR ATROPHY, UNSPECIFIED SITE: ICD-10-CM

## 2017-09-01 DIAGNOSIS — R26.81 GENERALLY UNSTEADY: ICD-10-CM

## 2017-09-01 DIAGNOSIS — M25.531 RIGHT WRIST PAIN: Primary | ICD-10-CM

## 2017-09-01 DIAGNOSIS — S42.032D CLOSED DISPLACED FRACTURE OF ACROMIAL END OF LEFT CLAVICLE WITH ROUTINE HEALING, SUBSEQUENT ENCOUNTER: ICD-10-CM

## 2017-09-01 DIAGNOSIS — R29.898 WEAKNESS OF BOTH ARMS: ICD-10-CM

## 2017-09-01 NOTE — TELEPHONE ENCOUNTER
Lata centeno Galata at [facility] is calling wanting to speak with Dr. Lady Partida lead nurse. He would like an updated PT order for treatment of patient. Lata Maurer can be reached at [phone].

## 2017-09-01 NOTE — TELEPHONE ENCOUNTER
OK for Eval and Treat, gentle ROM, gait training, gentle stretching, Cayden UE max 1-3 pounds lift, push, pull, carry. Ambulation / gait training.

## 2017-09-19 ENCOUNTER — OFFICE VISIT (OUTPATIENT)
Dept: ORTHOPEDIC SURGERY | Facility: CLINIC | Age: 67
End: 2017-09-19

## 2017-09-19 VITALS
HEART RATE: 64 BPM | DIASTOLIC BLOOD PRESSURE: 57 MMHG | OXYGEN SATURATION: 96 % | HEIGHT: 66 IN | SYSTOLIC BLOOD PRESSURE: 104 MMHG | RESPIRATION RATE: 16 BRPM

## 2017-09-19 DIAGNOSIS — M25.531 RIGHT WRIST PAIN: Primary | ICD-10-CM

## 2017-09-19 DIAGNOSIS — S42.032D CLOSED DISPLACED FRACTURE OF ACROMIAL END OF LEFT CLAVICLE WITH ROUTINE HEALING, SUBSEQUENT ENCOUNTER: ICD-10-CM

## 2017-09-19 RX ORDER — ACETAMINOPHEN 500 MG
TABLET ORAL
COMMUNITY

## 2017-09-19 RX ORDER — FLUTICASONE PROPIONATE 50 MCG
2 SPRAY, SUSPENSION (ML) NASAL DAILY
COMMUNITY
End: 2019-01-01 | Stop reason: SDUPTHER

## 2017-09-19 NOTE — PROGRESS NOTES
Patient: Claire Zhou                MRN: [id number]       SSN: [id number]  YOB: 1950        AGE: 79 y.o. SEX: male    PCP: Constantine Bailon MD  09/19/17 09/19/17: Patient follows up with caregiver for left clavicle and right wrist fracture. Limited communicator and poor historian due to severe autism. , no complaints per care giver. CC: RIGHT WRIST PAIN    HISTORY:  Claire Zhou is a 79 y.o. male who is seen for right wrist pain. His caregiver states that he slipped out of his wheelchair on 7/14/17. He was seen in the ED on 7/15/17. He is also seen for a left collarbone fracture. Patient was seen in the ED yesterday, 6/15/17, complaining of left shoulder pain. Patient's caregiver states that he may have fallen 3 weeks ago. He also definitely fell on 4/27/17. History is unclear as to exact date of shoulder injury causing his acute clavicle fracture. Caregiver states that the staff is claiming that he did not fall. He denies any injury or trauma but is a poor historian due to severe autism. He has tried Ibuprofen as needed. Patient had an ankle fracture treated in a cast 7/21/16 by Dr. Mary Alexandra. He has been on Fosamax since 2006. He has not had a bone density scan since 2010. Presents to the office wearing a left arm sling and in a wheelchair. Pain Assessment  9/19/2017   Location of Pain Wrist;Other (comment)   Pain Location Comment clavicle   Location Modifiers -   Severity of Pain 0   Quality of Pain -   Duration of Pain -   Frequency of Pain -   Limiting Behavior -   Relieving Factors -   Result of Injury -   Work-Related Injury -   Type of Injury -     Occupation, etc:  Mr. Diana Treadwell is non communicative with intellectual disability. He is assisted by a caregiver, Concetta Walden. Lele Driscoll is a supervisor of [employer] where the patient is cared for. Patient's parents have passed away. He does still have a living sister who visits occasionally.  Patient is wheelchair bound. Current weight is 110 pounds. He is 5'11\" tall. In 2011 he fell and had a major hematoma. Since then he has only been able to bed baths. Patient was previously treated by me for an L1 compression. 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 Social History     Social History    Marital status: SINGLE     Spouse name: N/A    Number of children: N/A    Years of education: N/A     Occupational History    Not on file. Social History Main Topics    Smoking status: Never Smoker    Smokeless tobacco: Never Used    Alcohol use No    Drug use: No    Sexual activity: No     Other Topics Concern    Caffeine Concern Yes     1 cup day    Exercise Yes     patient tries to be active everyday   [place] Yes     Social History Narrative      Allergies   Allergen Reactions    Levaquin [Levofloxacin] Rash     Other reaction(s): mild rash/itching    Other Medication Other (comments)     Bee stings  PPD- skin test    Pcn [Penicillins] Unable to Obtain     Other reaction(s): unknown    Tuberculin, Old Skin Test Other (comments)    Venom-Honey Bee      Other reaction(s): anaphylaxis/angioedema, swelling      Current Outpatient Prescriptions   Medication Sig    fluticasone (FLONASE) 50 mcg/actuation nasal spray 2 Sprays by Both Nostrils route daily.     acetaminophen (TYLENOL) 500 mg tablet Take  by mouth every six (6) hours as needed for Pain.  levothyroxine (SYNTHROID) 88 mcg tablet TAKE 1 TABLET BY MOUTH DAILY BEFORE BREAKFAST    docusate sodium (COLACE) 100 mg capsule Take 1 Cap by mouth nightly as needed for Constipation.  vitamin a & d (A&D) ointment Apply  to affected area as needed.  alendronate (FOSAMAX) 70 mg tablet Take 1 tabPO once weekly 30 min BEFORE any food of the day  with full glass H20,DON'T LAY DOWN for 30MIN AFTER med taken.  metoprolol tartrate (LOPRESSOR) 50 mg tablet TAKE 1 TABLET BY MOUTH TWICE DAILY    loratadine (CLARITIN) 10 mg tablet TAKE 1 TABLET BY MOUTH ONCE DAILY    megestrol (MEGACE) 40 mg tablet TAKE 1 TABLET BY MOUTH ONCE DAILY    multivit-min-FA-lycopen-lutein (CENTRUM SILVER) 0.4-300-250 mg-mcg-mcg tab Take 1 Tab by mouth daily.  calcium-cholecalciferol, D3, (CALTRATE 600+D) tablet TAKE 1 TABLET BY MOUTH TWICE DAILY    EPINEPHrine (EPIPEN) 0.3 mg/0.3 mL injection 0.3 mL by IntraMUSCular route once as needed.  polyethylene glycol (MIRALAX) 17 gram packet Take 1 Packet by mouth daily as needed.  LORazepam (ATIVAN) 0.5 mg tablet Take  by mouth nightly.  mirtazapine (REMERON) 30 mg tablet Take 30 mg by mouth two (2) times a day.  lacosamide (VIMPAT) 200 mg Tab tablet Take 200 mg by mouth two (2) times a day.  felbamate (FELBATOL) 600 mg tablet Take 1 Tab by mouth four (4) times daily.  topiramate (TOPAMAX) 200 mg tablet Take 200 mg by mouth two (2) times a day.  ibuprofen (MOTRIN) 600 mg tablet Take 1 Tab by mouth every six (6) hours as needed for Pain. (Patient not taking: Reported on 9/19/2017)    food supplement, lactose-free (ENSURE) liqd Take 237 mL by mouth four (4) times daily. (Patient not taking: Reported on 9/19/2017)     No current facility-administered medications for this visit.        PHYSICAL EXAMINATION:  Visit Vitals    /57 (BP 1 Location: Left arm, BP Patient Position: Sitting)    Pulse 64    Resp 16    Ht 5' 6\" (1.676 m)    SpO2 96%      ORTHO EXAMINATION:  Examination Left shoulder   Skin Intact   Effusion -   Biceps deformity -   Atrophy +   AC joint tenderness None   Acromial tenderness +   Biceps tenderness -   Forward flexion/Elevation ROM Non compliant with testing   Active abduction ROM Non Compliant with testing   External rotation ROM 30   Internal rotation ROM 70   Apprehension na   Impingement na   Drop Arm Test na   Neurovascular Intact   Bruising, tenderness and swelling over distal clavicle fracture site  Examination Right Wrist Left Wrist   Skin Intact Intact   Tenderness +, carpus  -   Flexion 40 40   Extension 30 30   Deformity - -   Effusion - -   Finger flexion Full Full   Finger extension Full Full   Tinel's sign - -   Phalen's test - -   Finklestein maneuver - -   Pain with thumb abduction - -   Capillary refill - -     RADIOGRAPHS:  XR RIGHT WRIST 7/15/17  IMPRESSION: Acute triquetral avulsion fracture, and as of imaging 09/19/17 healing noted    XR OF LEFT CLAVICLE 6/15/17  IMPRESSION:  There is minimally displaced fracture involving the distal left clavicle with 09/19/17, noting bone ball formation    XR OF LEFT SHOULDER 6/15/17  IMPRESSION:  There is distal left clavicular fracture. No other fracture or dislocation is  seen.     IMPRESSION:      ICD-10-CM ICD-9-CM    1. Right wrist pain M25.531 719.43 AMB POC XRAY, WRIST; COMPLETE, 3+ VIE      REFERRAL TO PHYSICAL THERAPY   2. Closed displaced fracture of acromial end of left clavicle with routine healing, subsequent encounter S42.032D V54.19 AMB POC XRAY; CLAVICLE, COMPLETE      REFERRAL TO PHYSICAL THERAPY     PLAN: PT left shoulder and right wrist, ROM gentle stretching.

## 2017-09-22 ENCOUNTER — DOCUMENTATION ONLY (OUTPATIENT)
Dept: ORTHOPEDIC SURGERY | Age: 67
End: 2017-09-22

## 2017-09-22 NOTE — PROGRESS NOTES
Alma Delia from [facility] called to advise that they will be seeing patient on Monday - they just received order.

## 2017-10-16 ENCOUNTER — HOSPITAL ENCOUNTER (OUTPATIENT)
Dept: INFUSION THERAPY | Age: 67
Discharge: HOME | End: 2017-10-16
Payer: MEDICARE

## 2017-10-16 VITALS
HEART RATE: 54 BPM | SYSTOLIC BLOOD PRESSURE: 104 MMHG | TEMPERATURE: 98.2 F | DIASTOLIC BLOOD PRESSURE: 63 MMHG | RESPIRATION RATE: 18 BRPM | OXYGEN SATURATION: 96 %

## 2017-10-16 PROCEDURE — 74011250636 HC RX REV CODE- 250/636: Performed by: INTERNAL MEDICINE

## 2017-10-16 PROCEDURE — 96372 THER/PROPH/DIAG INJ SC/IM: CPT

## 2017-10-16 RX ADMIN — DENOSUMAB 60 MG: 60 INJECTION SUBCUTANEOUS at 09:43

## 2017-10-16 NOTE — PROGRESS NOTES
[facility] Progress Note    Date: 2017    Name: Pradip Stubbs    MRN: [id number]         : 1950     Prolia Injection      Mr. Ibanez to [facility], via wheelchair at 0930. Pt was assessed and education was provided. Mr. Mehul Roberto vitals were reviewed and patient was observed for 5 minutes prior to treatment. Visit Vitals    /63 (BP 1 Location: Left arm, BP Patient Position: Sitting)    Pulse (!) 54    Temp 98.2 °F (36.8 °C)    Resp 18    SpO2 96%       Labs results scanned into chart. Calcium within normal limits. Patient is non-verbal. Caregiver with patient to answer questions. Prolia 60 mg was administered subcutaneous in the back of the left arm. No irritation or bleeding noted at site. Bandaid applied. Mr. Gilmar Simmons tolerated well, and had no complaints. Patient armband removed and shredded. Mr. Gilmar Simmons was discharged from [place] [address] in stable condition at 0950. He is to return on 2018 at 0900 for his next appointment.     Noah Julian RN  2017

## 2017-11-14 ENCOUNTER — HOSPITAL ENCOUNTER (OUTPATIENT)
Dept: LAB | Age: 67
Discharge: HOME | End: 2017-11-14
Payer: MEDICARE

## 2017-11-14 ENCOUNTER — OFFICE VISIT (OUTPATIENT)
Dept: FAMILY MEDICINE CLINIC | Age: 67
End: 2017-11-14

## 2017-11-14 VITALS
HEART RATE: 54 BPM | OXYGEN SATURATION: 95 % | SYSTOLIC BLOOD PRESSURE: 123 MMHG | RESPIRATION RATE: 22 BRPM | TEMPERATURE: 98 F | DIASTOLIC BLOOD PRESSURE: 72 MMHG

## 2017-11-14 DIAGNOSIS — E03.9 ACQUIRED HYPOTHYROIDISM: ICD-10-CM

## 2017-11-14 DIAGNOSIS — R56.9 SEIZURE (HCC): ICD-10-CM

## 2017-11-14 DIAGNOSIS — J06.9 VIRAL URI: Primary | ICD-10-CM

## 2017-11-14 DIAGNOSIS — Z13.6 SCREENING FOR CARDIOVASCULAR CONDITION: ICD-10-CM

## 2017-11-14 DIAGNOSIS — Z23 ENCOUNTER FOR IMMUNIZATION: ICD-10-CM

## 2017-11-14 LAB
ANION GAP SERPL CALC-SCNC: 5 MMOL/L (ref 3–18)
BUN SERPL-MCNC: 19 MG/DL (ref 7–18)
BUN/CREAT SERPL: 27 (ref 12–20)
CALCIUM SERPL-MCNC: 9 MG/DL (ref 8.5–10.1)
CHLORIDE SERPL-SCNC: 104 MMOL/L (ref 100–108)
CHOLEST SERPL-MCNC: 191 MG/DL
CO2 SERPL-SCNC: 30 MMOL/L (ref 21–32)
CREAT SERPL-MCNC: 0.7 MG/DL (ref 0.6–1.3)
GLUCOSE SERPL-MCNC: 88 MG/DL (ref 74–99)
HDLC SERPL-MCNC: 52 MG/DL (ref 40–60)
HDLC SERPL: 3.7 {RATIO} (ref 0–5)
LDLC SERPL CALC-MCNC: 114 MG/DL (ref 0–100)
LIPID PROFILE,FLP: ABNORMAL
POTASSIUM SERPL-SCNC: 4.3 MMOL/L (ref 3.5–5.5)
SODIUM SERPL-SCNC: 139 MMOL/L (ref 136–145)
T4 FREE SERPL-MCNC: 1.1 NG/DL (ref 0.7–1.5)
TRIGL SERPL-MCNC: 125 MG/DL (ref ?–150)
TSH SERPL DL<=0.05 MIU/L-ACNC: 1.34 UIU/ML (ref 0.36–3.74)
VLDLC SERPL CALC-MCNC: 25 MG/DL

## 2017-11-14 PROCEDURE — 84443 ASSAY THYROID STIM HORMONE: CPT | Performed by: FAMILY MEDICINE

## 2017-11-14 PROCEDURE — 80048 BASIC METABOLIC PNL TOTAL CA: CPT | Performed by: FAMILY MEDICINE

## 2017-11-14 PROCEDURE — 36415 COLL VENOUS BLD VENIPUNCTURE: CPT | Performed by: FAMILY MEDICINE

## 2017-11-14 PROCEDURE — 80061 LIPID PANEL: CPT | Performed by: FAMILY MEDICINE

## 2017-11-14 PROCEDURE — 84439 ASSAY OF FREE THYROXINE: CPT | Performed by: FAMILY MEDICINE

## 2017-11-14 NOTE — PATIENT INSTRUCTIONS

## 2017-11-14 NOTE — PROGRESS NOTES
HISTORY OF PRESENT ILLNESS  Pranav Ortiz is a 79 y.o. male. HPI  Here with his counselor. He has had cold sx with sneezing , rhinnorhea. No fever ; Has taken OTC cold meds; Had a 15 sec seizure last Thursday. This is within his norm; Appetite is stable. Needs a  refill on claritin , megace and metoprolol. Pt has hypothyroidism; He is on meds as listed below. PMH,  Meds, Allergies, Family History, Social history reviewed    Review of Systems   Constitutional: Negative for fever. Respiratory: Positive for cough. Negative for sputum production. Cardiovascular: Negative for chest pain and palpitations. Physical Exam   Constitutional: He appears well-developed and well-nourished. No distress. Cardiovascular: Normal rate and regular rhythm. Exam reveals no gallop and no friction rub. No murmur heard. Pulmonary/Chest: Breath sounds normal. No respiratory distress. He has no wheezes. He has no rales. Nursing note and vitals reviewed. ASSESSMENT and PLAN    ICD-10-CM ICD-9-CM    1. Viral URI J06.9 465.9     B97.89     2. Seizure ([facility] [address] R56.9 780.39    3. Acquired hypothyroidism E03.9 244.9 TSH 3RD GENERATION      T4, FREE   4. Encounter for immunization Z23 V03.89 INFLUENZA VIRUS VACCINE, HIGH DOSE SEASONAL, PRESERVATIVE FREE   5. Screening for cardiovascular condition Z13.6 V81.2 LIPID PANEL      METABOLIC PANEL, BASIC       As  Above,  Monitor cold,  Plenty of liquids  Flu shot today  Labs as ordered  Follow-up Disposition:  Return in about 4 months (around 3/14/2018) for thyroid , seizure. An After Visit Summary was printed and given to the patient. This has been fully explained to the patient, who indicates understanding.

## 2017-11-14 NOTE — MR AVS SNAPSHOT
Visit Information Date & Time Provider Department Dept. Phone Encounter #  
 11/14/2017 11:20 AM Mary Carmen Zamarripa, [address] [place] [address] Zoya Benavides [place] [facility] [id number] Follow-up Instructions Return in about 4 months (around 3/14/2018) for thyroid , seizure. Upcoming Health Maintenance Date Due  
 GLAUCOMA SCREENING Q2Y 3/16/2018* ZOSTER VACCINE AGE 60> 4/19/2018* MEDICARE YEARLY EXAM 3/7/2018 Pneumococcal 65+ Low/Medium Risk (2 of 2 - PPSV23) 7/12/2019 COLONOSCOPY 2/28/2021 DTaP/Tdap/Td series (3 - Td) 2/28/2026 *Topic was postponed. The date shown is not the original due date. Allergies as of 11/14/2017  Review Complete On: 11/14/2017 By: Mary Carmen Zamarripa MD  
  
 Severity Noted Reaction Type Reactions Levaquin [Levofloxacin]  03/16/2011    Rash Other reaction(s): mild rash/itching Other Medication  12/10/2009    Other (comments) Bee stings PPD- skin test  
 Pcn [Penicillins]  02/22/2010    Unable to Obtain Other reaction(s): unknown Tuberculin, Old Skin Test  08/22/2014    Other (comments) Venom-honey Bee  08/22/2014 Other reaction(s): anaphylaxis/angioedema, swelling Current Immunizations  Reviewed on 9/10/2014 Name Date Influenza High Dose Vaccine PF  Incomplete, 11/22/2016, 12/7/2015 Influenza Vaccine 9/10/2014, 3/24/2014 Influenza Vaccine Split 12/10/2009 Pneumococcal Conjugate (PCV-13) 12/7/2015 Pneumococcal Polysaccharide (PPSV-23) 7/12/2014  5:32 PM  
 Tdap 2/29/2016, 11/3/2011 Not reviewed this visit You Were Diagnosed With   
  
 Codes Comments Viral URI    -  Primary ICD-10-CM: J06.9, B97.89 ICD-9-CM: 465.9 Seizure ([address]     ICD-10-CM: R56.9 ICD-9-CM: 780.39 Acquired hypothyroidism     ICD-10-CM: E03.9 ICD-9-CM: 244.9 Encounter for immunization     ICD-10-CM: U15 ICD-9-CM: V03.89 Screening for cardiovascular condition     ICD-10-CM: Z13.6 ICD-9-CM: V81.2 Vitals BP Pulse Temp Resp SpO2 Smoking Status 123/72 (BP 1 Location: Right arm, BP Patient Position: Sitting) (!) 54 98 °F (36.7 °C) (Oral) 22 95% Never Smoker Vitals History Preferred Pharmacy Pharmacy Name Phone [facility] [address] [facility] [facility] 2/3 [phone] Your Updated Medication List  
  
   
This list is accurate as of: 11/14/17 12:03 PM.  Always use your most recent med list.  
  
  
  
  
 acetaminophen 500 mg tablet Commonly known as:  TYLENOL Take  by mouth every six (6) hours as needed for Pain. calcium-cholecalciferol (D3) tablet Commonly known as:  CALTRATE 600+D TAKE 1 TABLET BY MOUTH TWICE DAILY docusate sodium 100 mg capsule Commonly known as:  Jeanie Antis Take 1 Cap by mouth nightly as needed for Constipation. EPINEPHrine 0.3 mg/0.3 mL injection Commonly known as:  EPIPEN  
0.3 mL by IntraMUSCular route once as needed. felbamate 600 mg tablet Commonly known as:  Erik Sharma Take 1 Tab by mouth four (4) times daily. FLONASE 50 mcg/actuation nasal spray Generic drug:  fluticasone 2 Sprays by Both Nostrils route daily. food supplemt, lactose-reduced Liqd Commonly known as:  ENSURE Take 237 mL by mouth four (4) times daily. ibuprofen 600 mg tablet Commonly known as:  MOTRIN Take 1 Tab by mouth every six (6) hours as needed for Pain.  
  
 levothyroxine 88 mcg tablet Commonly known as:  SYNTHROID  
TAKE 1 TABLET BY MOUTH DAILY BEFORE BREAKFAST  
  
 loratadine 10 mg tablet Commonly known as:  CLARITIN  
TAKE 1 TABLET BY MOUTH ONCE DAILY LORazepam 0.5 mg tablet Commonly known as:  ATIVAN Take  by mouth nightly. megestrol 40 mg tablet Commonly known as:  MEGACE  
TAKE 1 TABLET BY MOUTH ONCE DAILY  
  
 metoprolol tartrate 50 mg tablet Commonly known as:  LOPRESSOR  
TAKE 1 TABLET BY MOUTH TWICE DAILY  
  
 mirtazapine 30 mg tablet Commonly known as:  Dorethea Medal Take 30 mg by mouth two (2) times a day. multivit-min-FA-lycopen-lutein 0.4-300-250 mg-mcg-mcg Tab Commonly known as:  CENTRUM SILVER Take 1 Tab by mouth daily. polyethylene glycol 17 gram packet Commonly known as:  Henrey Nadia Take 1 Packet by mouth daily as needed. PROLIA 60 mg/mL injection Generic drug:  denosumab 60 mg by SubCUTAneous route every 6 months. TOPAMAX 200 mg tablet Generic drug:  topiramate Take 200 mg by mouth two (2) times a day. VIMPAT 200 mg Tab tablet Generic drug:  lacosamide Take 200 mg by mouth two (2) times a day. vitamin a & d ointment Commonly known as:  A&D Apply  to affected area as needed. We Performed the Following INFLUENZA VIRUS VACCINE, HIGH DOSE SEASONAL, PRESERVATIVE FREE [63890 CPT(R)] Follow-up Instructions Return in about 4 months (around 3/14/2018) for thyroid , seizure. To-Do List   
 11/14/2017 Lab:  LIPID PANEL   
  
 11/14/2017 Lab:  METABOLIC PANEL, BASIC   
  
 11/14/2017 Lab:  T4, FREE   
  
 11/14/2017 Lab:  TSH 3RD GENERATION   
  
 04/16/2018 9:00 AM  
  Appointment with [facility] FA[facility] TRACK NURSE at [facility] ([phone]) Patient Instructions Influenza (Flu) Vaccine (Inactivated or Recombinant): What You Need to Know Why get vaccinated? Influenza (\"flu\") is a contagious disease that spreads around the [place] every winter, usually between October and May. Flu is caused by influenza viruses and is spread mainly by coughing, sneezing, and close contact. Anyone can get flu. Flu strikes suddenly and can last several days. Symptoms vary by age, but can include: · Fever/chills. · Sore throat. · Muscle aches. · Fatigue. · Cough. · Headache. · Runny or stuffy nose.  
Flu can also lead to pneumonia and blood infections, and cause diarrhea and seizures in children. If you have a medical condition, such as heart or lung disease, flu can make it worse. Flu is more dangerous for some people. Infants and young children, people 72years of age and older, pregnant women, and people with certain health conditions or a weakened immune system are at greatest risk. Each year thousands of people in the [facility] die from flu, and many more are hospitalized. Flu vaccine can: · Keep you from getting flu. · Make flu less severe if you do get it. · Keep you from spreading flu to your family and other people. Inactivated and recombinant flu vaccines A dose of flu vaccine is recommended every flu season. Children 6 months through 6years of age may need two doses during the same flu season. Everyone else needs only one dose each flu season. Some inactivated flu vaccines contain a very small amount of a mercury-based preservative called thimerosal. Studies have not shown thimerosal in vaccines to be harmful, but flu vaccines that do not contain thimerosal are available. There is no live flu virus in flu shots. They cannot cause the flu. There are many flu viruses, and they are always changing. Each year a new flu vaccine is made to protect against three or four viruses that are likely to cause disease in the upcoming flu season. But even when the vaccine doesn't exactly match these viruses, it may still provide some protection. Flu vaccine cannot prevent: · Flu that is caused by a virus not covered by the vaccine. · Illnesses that look like flu but are not. Some people should not get this vaccine Tell the person who is giving you the vaccine: · If you have any severe (life-threatening) allergies. If you ever had a life-threatening allergic reaction after a dose of flu vaccine, or have a severe allergy to any part of this vaccine, you may be advised not to get vaccinated.  Most, but not all, types of flu vaccine contain a small amount of egg protein. · If you ever had Guillain-Barré syndrome (also called GBS) Some people with a history of GBS should not get this vaccine. This should be discussed with your doctor. · If you are not feeling well. It is usually okay to get flu vaccine when you have a mild illness, but you might be asked to come back when you feel better. Risks of a vaccine reaction With any medicine, including vaccines, there is a chance of reactions. These are usually mild and go away on their own, but serious reactions are also possible. Most people who get a flu shot do not have any problems with it. Minor problems following a flu shot include: · Soreness, redness, or swelling where the shot was given · Hoarseness · Sore, red or itchy eyes · Cough · Fever · Aches · Headache · Itching · Fatigue If these problems occur, they usually begin soon after the shot and last 1 or 2 days. More serious problems following a flu shot can include the following: · There may be a small increased risk of Guillain-Barré Syndrome (GBS) after inactivated flu vaccine. This risk has been estimated at 1 or 2 additional cases per million people vaccinated. This is much lower than the risk of severe complications from flu, which can be prevented by flu vaccine. · Nishant Varma children who get the flu shot along with pneumococcal vaccine (PCV13) and/or DTaP vaccine at the same time might be slightly more likely to have a seizure caused by fever. Ask your doctor for more information. Tell your doctor if a child who is getting flu vaccine has ever had a seizure Problems that could happen after any injected vaccine: · People sometimes faint after a medical procedure, including vaccination. Sitting or lying down for about 15 minutes can help prevent fainting, and injuries caused by a fall. Tell your doctor if you feel dizzy, or have vision changes or ringing in the ears.  
· Some people get severe pain in the shoulder and have difficulty moving the arm where a shot was given. This happens very rarely. · Any medication can cause a severe allergic reaction. Such reactions from a vaccine are very rare, estimated at about 1 in a million doses, and would happen within a few minutes to a few hours after the vaccination. As with any medicine, there is a very remote chance of a vaccine causing a serious injury or death. The safety of vaccines is always being monitored. For more information, visit: www.cdc.gov/vaccinesafety/. What if there is a serious reaction? What should I look for? · Look for anything that concerns you, such as signs of a severe allergic reaction, very high fever, or unusual behavior. Signs of a severe allergic reaction can include hives, swelling of the face and throat, difficulty breathing, a fast heartbeat, dizziness, and weakness - usually within a few minutes to a few hours after the vaccination. What should I do? · If you think it is a severe allergic reaction or other emergency that can't wait, call 9-1-1 and get the person to the nearest hospital. Otherwise, call your doctor. · Reactions should be reported to the \"Vaccine Adverse Event Reporting System\" (VAERS). Your doctor should file this report, or you can do it yourself through the VAERS website at www.vaers. [facility].gov, or by calling 4-162.274.4314. [employer] does not give medical advice. The National Vaccine Injury Compensation Program 
The National Vaccine Injury Compensation Program (VICP) is a federal program that was created to compensate people who may have been injured by certain vaccines. Persons who believe they may have been injured by a vaccine can learn about the program and about filing a claim by calling 3-816.204.9660 or visiting the [employer] website at www.[facility].gov/vaccinecompensation. There is a time limit to file a claim for compensation. How can I learn more? · Ask your healthcare provider.  He or she can give you the vaccine package insert or suggest other sources of information. · Call your local or state health department. · Contact the Centers for Disease Control and Prevention (CDC): 
¨ Call 3-258.367.5668 (1-800-CDC-INFO) or ¨ Visit CDC's website at www.cdc.gov/flu Vaccine Information Statement Inactivated Influenza Vaccine 8/7/2015) [address] 450QB-62 [facility] [employer] Centers for Disease Control and Prevention Many Vaccine Information Statements are available in Persian and other languages. See www.immunize.org/vis. Muchas hojas de información sobre vacunas están disponibles en español y en otros idiomas. Visite www.immunize.org/vis. Care instructions adapted under license by [employer] (which disclaims liability or warranty for this information). If you have questions about a medical condition or this instruction, always ask your healthcare professional. [address] any warranty or liability for your use of this information. Introducing \[facility] [place] [facility]\"" & HEALTH SERVICES! Dear Nilda Guo: 
Thank you for requesting a [employer] account. Our records indicate that you already have an active [employer] account. You can access your account anytime at [email or web address] Did you know that you can access your hospital and ER discharge instructions at any time in [employer]? You can also review all of your test results from your hospital stay or ER visit. Additional Information If you have questions, please visit the Frequently Asked Questions section of the [employer] website at [email or web address] Remember, [employer] is NOT to be used for urgent needs. For medical emergencies, dial 911. Now available from your iPhone and Android! Please provide this summary of care documentation to your next provider. Your primary care clinician is listed as [address].  If you have any questions after today's visit, please call [phone].

## 2017-11-14 NOTE — PROGRESS NOTES
1. Have you been to the ER, urgent care clinic since your last visit? Hospitalized since your last visit? Yes Where: ER at [facility] for fall/clavicle and wrist fx    2. Have you seen or consulted any other health care providers outside of the [address] [place] [address] since your last visit? Include any pap smears or colon screening.  Yes Where: Endocrinologist Dr. Laila Basilio on [facility]

## 2017-12-22 RX ORDER — LORATADINE 10 MG/1
TABLET ORAL
Qty: 30 TAB | Refills: 0 | Status: SHIPPED | OUTPATIENT
Start: 2017-12-22 | End: 2018-03-12 | Stop reason: SDUPTHER

## 2017-12-22 RX ORDER — MEGESTROL ACETATE 40 MG/1
TABLET ORAL
Qty: 30 TAB | Refills: 0 | Status: SHIPPED | OUTPATIENT
Start: 2017-12-22 | End: 2018-03-12 | Stop reason: SDUPTHER

## 2017-12-22 RX ORDER — METOPROLOL TARTRATE 50 MG/1
TABLET ORAL
Qty: 60 TAB | Refills: 0 | Status: SHIPPED | OUTPATIENT
Start: 2017-12-22 | End: 2018-02-17 | Stop reason: SDUPTHER

## 2018-01-01 RX ORDER — MEGESTROL ACETATE 40 MG/1
TABLET ORAL
Qty: 30 TAB | Refills: 0 | Status: SHIPPED | OUTPATIENT
Start: 2018-01-01 | End: 2019-01-01

## 2018-02-08 RX ORDER — EPINEPHRINE 0.3 MG/.3ML
INJECTION INTRAMUSCULAR
Qty: 2 SYRINGE | Refills: 0 | Status: SHIPPED | OUTPATIENT
Start: 2018-02-08

## 2018-02-20 RX ORDER — MEGESTROL ACETATE 40 MG/1
TABLET ORAL
Qty: 30 TAB | Refills: 0 | Status: SHIPPED | OUTPATIENT
Start: 2018-02-20 | End: 2018-03-12 | Stop reason: SDUPTHER

## 2018-02-20 RX ORDER — METOPROLOL TARTRATE 50 MG/1
TABLET ORAL
Qty: 60 TAB | Refills: 0 | Status: SHIPPED | OUTPATIENT
Start: 2018-02-20 | End: 2018-03-12 | Stop reason: SDUPTHER

## 2018-02-20 RX ORDER — MULTIVIT-MIN/FA/LYCOPEN/LUTEIN .4-300-25
TABLET ORAL
Qty: 30 TAB | Refills: 0 | Status: SHIPPED | OUTPATIENT
Start: 2018-02-20 | End: 2018-03-12 | Stop reason: ALTCHOICE

## 2018-02-20 RX ORDER — LORATADINE 10 MG/1
TABLET ORAL
Qty: 30 TAB | Refills: 0 | Status: SHIPPED | OUTPATIENT
Start: 2018-02-20 | End: 2018-03-12 | Stop reason: SDUPTHER

## 2018-02-20 RX ORDER — MULTIVITAMIN
TABLET ORAL
Qty: 60 TAB | Refills: 0 | Status: SHIPPED | OUTPATIENT
Start: 2018-02-20 | End: 2018-03-12 | Stop reason: SDUPTHER

## 2018-03-12 ENCOUNTER — OFFICE VISIT (OUTPATIENT)
Dept: FAMILY MEDICINE CLINIC | Age: 68
End: 2018-03-12

## 2018-03-12 ENCOUNTER — HOSPITAL ENCOUNTER (OUTPATIENT)
Dept: LAB | Age: 68
Discharge: HOME | End: 2018-03-12
Payer: MEDICARE

## 2018-03-12 VITALS
OXYGEN SATURATION: 94 % | RESPIRATION RATE: 16 BRPM | HEART RATE: 54 BPM | DIASTOLIC BLOOD PRESSURE: 71 MMHG | SYSTOLIC BLOOD PRESSURE: 117 MMHG | HEIGHT: 66 IN | TEMPERATURE: 98.2 F

## 2018-03-12 DIAGNOSIS — E03.9 ACQUIRED HYPOTHYROIDISM: ICD-10-CM

## 2018-03-12 DIAGNOSIS — Z12.5 SCREENING FOR PROSTATE CANCER: ICD-10-CM

## 2018-03-12 DIAGNOSIS — Z13.6 SCREENING FOR CARDIOVASCULAR CONDITION: ICD-10-CM

## 2018-03-12 DIAGNOSIS — Z00.00 MEDICARE ANNUAL WELLNESS VISIT, SUBSEQUENT: Primary | ICD-10-CM

## 2018-03-12 LAB
ANION GAP SERPL CALC-SCNC: 4 MMOL/L (ref 3–18)
BUN SERPL-MCNC: 16 MG/DL (ref 7–18)
BUN/CREAT SERPL: 25 (ref 12–20)
CALCIUM SERPL-MCNC: 9.6 MG/DL (ref 8.5–10.1)
CHLORIDE SERPL-SCNC: 106 MMOL/L (ref 100–108)
CHOLEST SERPL-MCNC: 197 MG/DL
CO2 SERPL-SCNC: 29 MMOL/L (ref 21–32)
CREAT SERPL-MCNC: 0.65 MG/DL (ref 0.6–1.3)
GLUCOSE SERPL-MCNC: 74 MG/DL (ref 74–99)
HDLC SERPL-MCNC: 58 MG/DL (ref 40–60)
HDLC SERPL: 3.4 {RATIO} (ref 0–5)
LDLC SERPL CALC-MCNC: 119.2 MG/DL (ref 0–100)
LIPID PROFILE,FLP: ABNORMAL
POTASSIUM SERPL-SCNC: 4.1 MMOL/L (ref 3.5–5.5)
SODIUM SERPL-SCNC: 139 MMOL/L (ref 136–145)
TRIGL SERPL-MCNC: 99 MG/DL (ref ?–150)
TSH SERPL DL<=0.05 MIU/L-ACNC: 0.51 UIU/ML (ref 0.36–3.74)
VLDLC SERPL CALC-MCNC: 19.8 MG/DL

## 2018-03-12 PROCEDURE — 84443 ASSAY THYROID STIM HORMONE: CPT | Performed by: FAMILY MEDICINE

## 2018-03-12 PROCEDURE — 80061 LIPID PANEL: CPT | Performed by: FAMILY MEDICINE

## 2018-03-12 PROCEDURE — 80048 BASIC METABOLIC PNL TOTAL CA: CPT | Performed by: FAMILY MEDICINE

## 2018-03-12 PROCEDURE — 36415 COLL VENOUS BLD VENIPUNCTURE: CPT | Performed by: FAMILY MEDICINE

## 2018-03-12 RX ORDER — MULTIVITAMIN
TABLET ORAL
Qty: 60 TAB | Refills: 6 | Status: SHIPPED | OUTPATIENT
Start: 2018-03-12 | End: 2018-10-09 | Stop reason: SDUPTHER

## 2018-03-12 RX ORDER — LEVOTHYROXINE SODIUM 88 UG/1
TABLET ORAL
Qty: 30 TAB | Refills: 6 | Status: SHIPPED | OUTPATIENT
Start: 2018-03-12 | End: 2018-09-26 | Stop reason: SDUPTHER

## 2018-03-12 RX ORDER — MEGESTROL ACETATE 40 MG/1
TABLET ORAL
Qty: 30 TAB | Refills: 6 | Status: SHIPPED | OUTPATIENT
Start: 2018-03-12 | End: 2019-01-01 | Stop reason: SDUPTHER

## 2018-03-12 RX ORDER — METOPROLOL TARTRATE 50 MG/1
TABLET ORAL
Qty: 60 TAB | Refills: 6 | Status: SHIPPED | OUTPATIENT
Start: 2018-03-12 | End: 2018-09-26 | Stop reason: SDUPTHER

## 2018-03-12 RX ORDER — LORATADINE 10 MG/1
TABLET ORAL
Qty: 30 TAB | Refills: 6 | Status: SHIPPED | OUTPATIENT
Start: 2018-03-12 | End: 2018-09-26 | Stop reason: SDUPTHER

## 2018-03-12 NOTE — MR AVS SNAPSHOT
[address] [place] [address] 
 
 
 [id number] [place] [address] 
[phone] Patient: Samantha Caba MRN: [id number] OXM:[id number] Visit Information Date & Time Provider Department Dept. Phone Encounter #  
 3/12/2018  2:20 PM Kiersten Serna[address] [phone] [id number] Upcoming Health Maintenance Date Due  
 GLAUCOMA SCREENING Q2Y 3/16/2018* ZOSTER VACCINE AGE 60> 4/19/2018* MEDICARE YEARLY EXAM 3/13/2019 Pneumococcal 65+ Low/Medium Risk (2 of 2 - PPSV23) 7/12/2019 COLONOSCOPY 2/28/2021 DTaP/Tdap/Td series (3 - Td) 2/28/2026 *Topic was postponed. The date shown is not the original due date. Allergies as of 3/12/2018  Review Complete On: 3/12/2018 By: Sherry Cherry LPN Severity Noted Reaction Type Reactions Levaquin [Levofloxacin]  03/16/2011    Rash Other reaction(s): mild rash/itching Other Medication  12/10/2009    Other (comments) Bee stings PPD- skin test  
 Pcn [Penicillins]  02/22/2010    Unable to Obtain Other reaction(s): unknown Tuberculin, Old Skin Test  08/22/2014    Other (comments) Venom-honey Bee  08/22/2014 Other reaction(s): anaphylaxis/angioedema, swelling Current Immunizations  Reviewed on 9/10/2014 Name Date Influenza High Dose Vaccine PF 11/14/2017, 11/22/2016, 12/7/2015 Influenza Vaccine 9/10/2014, 3/24/2014 Influenza Vaccine Split 12/10/2009 Pneumococcal Conjugate (PCV-13) 12/7/2015 Pneumococcal Polysaccharide (PPSV-23) 7/12/2014  5:32 PM  
 Tdap 2/29/2016, 11/3/2011 Not reviewed this visit You Were Diagnosed With   
  
 Codes Comments Medicare annual wellness visit, subsequent    -  Primary ICD-10-CM: Z00.00 ICD-9-CM: V70.0 Screening for cardiovascular condition     ICD-10-CM: Z13.6 ICD-9-CM: V81.2 Screening for prostate cancer     ICD-10-CM: Z12.5 ICD-9-CM: V76.44   
 Acquired hypothyroidism     ICD-10-CM: E03.9 ICD-9-CM: 722. 9 Vitals BP Pulse Temp Resp Height(growth percentile) SpO2  
 117/71 (BP 1 Location: Right arm, BP Patient Position: Sitting) (!) 54 98.2 °F (36.8 °C) (Oral) 16 5' 6\" (1.676 m) 94% Smoking Status Never Smoker Preferred Pharmacy Pharmacy Name Phone [facility] [address] [facility] 2/3 [phone] Your Updated Medication List  
  
   
This list is accurate as of 3/12/18  3:20 PM.  Always use your most recent med list.  
  
  
  
  
 acetaminophen 500 mg tablet Commonly known as:  TYLENOL Take  by mouth every six (6) hours as needed for Pain. calcium-cholecalciferol (D3) tablet Commonly known as:  CALTRATE 600+D TAKE 1 TABLET BY MOUTH TWICE DAILY docusate sodium 100 mg capsule Commonly known as:  Donalynn Leisa Take 1 Cap by mouth nightly as needed for Constipation. EPIPEN 2-RAJNI 0.3 mg/0.3 mL injection Generic drug:  EPINEPHrine INJECT I.M. 0.3ML ONCE AS NEEDED FOR BEE STINGS  
  
 felbamate 600 mg tablet Commonly known as:  Caden Heys Take 1 Tab by mouth four (4) times daily. FLONASE 50 mcg/actuation nasal spray Generic drug:  fluticasone 2 Sprays by Both Nostrils route daily. food supplemt, lactose-reduced Liqd Commonly known as:  ENSURE Take 237 mL by mouth four (4) times daily. ibuprofen 600 mg tablet Commonly known as:  MOTRIN Take 1 Tab by mouth every six (6) hours as needed for Pain.  
  
 levothyroxine 88 mcg tablet Commonly known as:  SYNTHROID  
TAKE 1 TABLET BY MOUTH DAILY BEFORE BREAKFAST  
  
 loratadine 10 mg tablet Commonly known as:  CLARITIN  
TAKE 1 TABLET BY MOUTH ONCE DAILY LORazepam 0.5 mg tablet Commonly known as:  ATIVAN Take  by mouth nightly. megestrol 40 mg tablet Commonly known as:  MEGACE  
TAKE 1 TABLET BY MOUTH ONCE DAILY metoprolol tartrate 50 mg tablet Commonly known as:  LOPRESSOR  
TAKE 1 TABLET BY MOUTH TWICE DAILY  
  
 mirtazapine 30 mg tablet Commonly known as:  Doc Slade Take 30 mg by mouth two (2) times a day. multivit-min-FA-lycopen-lutein 0.4-300-250 mg-mcg-mcg Tab Commonly known as:  CENTRUM SILVER Take 1 Tab by mouth daily. polyethylene glycol 17 gram packet Commonly known as:  Cowden Goodpasture Take 1 Packet by mouth daily as needed. PROLIA 60 mg/mL injection Generic drug:  denosumab 60 mg by SubCUTAneous route every 6 months. TOPAMAX 200 mg tablet Generic drug:  topiramate Take 200 mg by mouth two (2) times a day. VIMPAT 200 mg Tab tablet Generic drug:  lacosamide Take 200 mg by mouth two (2) times a day. vitamin a & d ointment Commonly known as:  A&D Apply  to affected area as needed. Prescriptions Sent to Pharmacy Refills  
 loratadine (CLARITIN) 10 mg tablet 6 Sig: TAKE 1 TABLET BY MOUTH ONCE DAILY Class: Normal  
 Pharmacy: [facility] [address] [facility]/3 Ph #: [phone]  
 calcium-cholecalciferol, D3, (CALTRATE 600+D) tablet 6 Sig: TAKE 1 TABLET BY MOUTH TWICE DAILY Class: Normal  
 Pharmacy: [address] 2/3 Ph #: [phone]  
 multivit-min-FA-lycopen-lutein (CENTRUM SILVER) 0.4-300-250 mg-mcg-mcg tab 11 Sig: Take 1 Tab by mouth daily. Class: Normal  
 Pharmacy: [facility] [address] [facility] 2/3 Ph #: [phone] Route: Oral  
 levothyroxine (SYNTHROID) 88 mcg tablet 6 Sig: TAKE 1 TABLET BY MOUTH DAILY BEFORE BREAKFAST Class: Normal  
 Pharmacy: [facility] [address] 2/3 Ph #: [phone]  
 megestrol (MEGACE) 40 mg tablet 6 Sig: TAKE 1 TABLET BY MOUTH ONCE DAILY  Class: Normal  
 Pharmacy: [facility] [address] [place] [facility]3 Ph #: [phone]  
 metoprolol tartrate (LOPRESSOR) 50 mg tablet 6 Sig: TAKE 1 TABLET BY MOUTH TWICE DAILY Class: Normal  
 Pharmacy: [facility] [address] [facility] [place] 2/3 Ph #: [phone] To-Do List   
 03/12/2018 Lab:  LIPID PANEL   
  
 03/12/2018 Lab:  METABOLIC PANEL, BASIC   
  
 03/12/2018 Lab:  TSH 3RD GENERATION   
  
 04/16/2018 9:00 AM  
  Appointment with [facility] NURSE at [facility] ([phone]) Patient Instructions Medicare Wellness Visit, Male The best way to live healthy is to have a healthy lifestyle by eating a well-balanced diet, exercising regularly, limiting alcohol and stopping smoking. Regular physical exams and screening tests are another way to keep healthy. Preventive exams provided by your health care provider can find health problems before they become diseases or illnesses. Preventive services including immunizations, screening tests, monitoring and exams can help you take care of your own health. All people over age 72 should have a pneumovax  and and a prevnar shot to prevent pneumonia. These are once in a lifetime unless you and your provider decide differently. All people over 65 should have a yearly flu shot and a tetanus vaccine every 10 years. Screening for diabetes mellitus with a blood sugar test should be done every year. Glaucoma is a disease of the eye due to increased ocular pressure that can lead to blindness and it should be done every year by an eye professional. 
 
Cardiovascular screening tests that check for elevated lipids (fatty part of blood) which can lead to heart disease and strokes should be done every 5 years.  
 
Colorectal screening that evaluates for blood or polyps in your colon should be done yearly as a stool test or every five years as a flexible sigmoidoscope or every 10 years as a colonoscopy up to age 76. Men up to age 76 may need a screening blood test for prostate cancer at certain intervals, depending on their personal and family history. This decision is between the patient and his provider. If you have been a smoker or had family history of abdominal aortic aneurysms, you and your provider may decide to schedule an ultrasound test of your aorta. Hepatitis C screening is also recommended for anyone born between [birth year] through [address]. A shingles vaccine is also recommended once in a lifetime after age 61. Your Medicare Wellness Exam is recommended annually. Here is a list of your current Health Maintenance items with a due date: 
Health Maintenance Due Topic Date Due  
 Annual Well Visit  03/07/2018 Well Visit, Over 72: Care Instructions Your Care Instructions Physical exams can help you stay healthy. Your doctor has checked your overall health and may have suggested ways to take good care of yourself. He or she also may have recommended tests. At home, you can help prevent illness with healthy eating, regular exercise, and other steps. Follow-up care is a key part of your treatment and safety. Be sure to make and go to all appointments, and call your doctor if you are having problems. It's also a good idea to know your test results and keep a list of the medicines you take. How can you care for yourself at home? · Reach and stay at a healthy weight. This will lower your risk for many problems, such as obesity, diabetes, heart disease, and high blood pressure. · Get at least 30 minutes of exercise on most days of the week. Walking is a good choice. You also may want to do other activities, such as running, swimming, cycling, or playing tennis or team sports. · Do not smoke. Smoking can make health problems worse. If you need help quitting, talk to your doctor about stop-smoking programs and medicines. These can increase your chances of quitting for good. · Protect your skin from too much sun. When you're outdoors from 10 a.m. to 4 p.m., stay in the shade or cover up with clothing and a hat with a wide brim. Wear sunglasses that block UV rays. Even when it's cloudy, put broad-spectrum sunscreen (SPF 30 or higher) on any exposed skin. · See a dentist one or two times a year for checkups and to have your teeth cleaned. · Wear a seat belt in the car. · Limit alcohol to 2 drinks a day for men and 1 drink a day for women. Too much alcohol can cause health problems. Follow your doctor's advice about when to have certain tests. These tests can spot problems early. For men and women · Cholesterol. Your doctor will tell you how often to have this done based on your overall health and other things that can increase your risk for heart attack and stroke. · Blood pressure. Have your blood pressure checked during a routine doctor visit. Your doctor will tell you how often to check your blood pressure based on your age, your blood pressure results, and other factors. · Diabetes. Ask your doctor whether you should have tests for diabetes. · Vision. Experts recommend that you have yearly exams for glaucoma and other age-related eye problems. · Hearing. Tell your doctor if you notice any change in your hearing. You can have tests to find out how well you hear. · Colon cancer tests. Keep having colon cancer tests as your doctor recommends. You can have one of several types of tests. · Heart attack and stroke risk. At least every 4 to 6 years, you should have your risk for heart attack and stroke assessed. Your doctor uses factors such as your age, blood pressure, cholesterol, and whether you smoke or have diabetes to show what your risk for a heart attack or stroke is over the next 10 years. · Osteoporosis.  Talk to your doctor about whether you should have a bone density test to find out whether you have thinning bones. Also ask your doctor about whether you should take calcium and vitamin D supplements. For women · Pap test and pelvic exam. You may no longer need a Pap test. Talk with your doctor about whether to stop or continue to have Pap tests. · Breast exam and mammogram. Ask how often you should have a mammogram, which is an X-ray of your breasts. A mammogram can spot breast cancer before it can be felt and when it is easiest to treat. · Thyroid disease. Talk to your doctor about whether to have your thyroid checked as part of a regular physical exam. Women have an increased chance of a thyroid problem. For men · Prostate exam. Talk to your doctor about whether you should have a blood test (called a PSA test) for prostate cancer. Experts disagree on whether men should have this test. Some experts recommend that you discuss the benefits and risks of the test with your doctor. · Abdominal aortic aneurysm. Ask your doctor whether you should have a test to check for an aneurysm. You may need a test if you ever smoked or if your parent, brother, sister, or child has had an aneurysm. When should you call for help? Watch closely for changes in your health, and be sure to contact your doctor if you have any problems or symptoms that concern you. Where can you learn more? Go to http://elvia-arden.info/. Enter B598 in the search box to learn more about \"Well Visit, Over 65: Care Instructions. \" Current as of: May 12, 2017 Content Version: 11.4 © 8354-6371 [employer]wise, Incorporated. Care instructions adapted under license by [employer] (which disclaims liability or warranty for this information). If you have questions about a medical condition or this instruction, always ask your healthcare professional. [place] [address] any warranty or liability for your use of this information. Introducing [facility]! Dear Dari Stearns: 
Thank you for requesting a App[employer] account. Our records indicate that you already have an active [employer] account. You can access your account anytime at [email or web address] Did you know that you can access your hospital and ER discharge instructions at any time in App[employer]ple? You can also review all of your test results from your hospital stay or ER visit. Additional Information If you have questions, please visit the Frequently Asked Questions section of the [employer] website at [email or web address] Remember, App[employer] is NOT to be used for urgent needs. For medical emergencies, dial 911. Now available from your iPhone and Android! Please provide this summary of care documentation to your next provider. Your primary care clinician is listed as [address]. If you have any questions after today's visit, please call [phone].

## 2018-03-12 NOTE — PROGRESS NOTES
This is the Subsequent Medicare Annual Wellness Exam, performed 12 months or more after the Initial AWV or the last Subsequent AWV    I have reviewed the patient's medical history in detail and updated the computerized patient record. Pt is a [facility] resident: per aide pt has some superficial skin irritation noted on the right hip. Exam done today of area unrevealing/ normal.  Needs a refill    History     Past Medical History:   Diagnosis Date    Cataract 12/10/2009    Chronic back pain     History of chronic back problems off and on for the past several years. He has responded to back injections in the past.      Dysphagia     Fall     The patient fell during a seizure. He noted increased back pain since that time. X-rays were reported negative at [facility].     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    Seizure ([address] 12/10/2009    none recently    Subdural hematoma (HCC)     S/P fall 11/3/2011    Thyroid disease       Past Surgical History:   Procedure Laterality Date    HX CATARACT REMOVAL      right eye    HX ORTHOPAEDIC      vertebral compression fracture     Current Outpatient Prescriptions   Medication Sig Dispense Refill    loratadine (CLARITIN) 10 mg tablet TAKE 1 TABLET BY MOUTH ONCE DAILY 30 Tab 6    calcium-cholecalciferol, D3, (CALTRATE 600+D) tablet TAKE 1 TABLET BY MOUTH TWICE DAILY 60 Tab 6    multivit-min-FA-lycopen-lutein (CENTRUM SILVER) 0.4-300-250 mg-mcg-mcg tab Take 1 Tab by mouth daily.  30 Tab 11    levothyroxine (SYNTHROID) 88 mcg tablet TAKE 1 TABLET BY MOUTH DAILY BEFORE BREAKFAST 30 Tab 6    megestrol (MEGACE) 40 mg tablet TAKE 1 TABLET BY MOUTH ONCE DAILY 30 Tab 6    metoprolol tartrate (LOPRESSOR) 50 mg tablet TAKE 1 TABLET BY MOUTH TWICE DAILY 60 Tab 6    EPIPEN 2-RAJNI 0.3 mg/0.3 mL injection INJECT I.M. 0.3ML ONCE AS NEEDED FOR BEE STINGS 2 Syringe 0    denosumab (PROLIA) 60 mg/mL injection 60 mg by SubCUTAneous route every 6 months.  fluticasone (FLONASE) 50 mcg/actuation nasal spray 2 Sprays by Both Nostrils route daily.  acetaminophen (TYLENOL) 500 mg tablet Take  by mouth every six (6) hours as needed for Pain.  docusate sodium (COLACE) 100 mg capsule Take 1 Cap by mouth nightly as needed for Constipation. 90 Cap 2    ibuprofen (MOTRIN) 600 mg tablet Take 1 Tab by mouth every six (6) hours as needed for Pain. 20 Tab 0    vitamin a & d (A&D) ointment Apply  to affected area as needed. 60 g 6    polyethylene glycol (MIRALAX) 17 gram packet Take 1 Packet by mouth daily as needed. 30 Each 6    LORazepam (ATIVAN) 0.5 mg tablet Take  by mouth nightly.  mirtazapine (REMERON) 30 mg tablet Take 30 mg by mouth two (2) times a day.  food supplement, lactose-free (ENSURE) liqd Take 237 mL by mouth four (4) times daily. 1000 mL 3    lacosamide (VIMPAT) 200 mg Tab tablet Take 200 mg by mouth two (2) times a day.  felbamate (FELBATOL) 600 mg tablet Take 1 Tab by mouth four (4) times daily. 120 Tab 6    topiramate (TOPAMAX) 200 mg tablet Take 200 mg by mouth two (2) times a day.        Allergies   Allergen Reactions    Levaquin [Levofloxacin] Rash     Other reaction(s): mild rash/itching    Other Medication Other (comments)     Bee stings  PPD- skin test    Pcn [Penicillins] Unable to Obtain     Other reaction(s): unknown    Tuberculin, Old Skin Test Other (comments)    Venom-Honey Bee      Other reaction(s): anaphylaxis/angioedema, swelling     Family History   Problem Relation Age of Onset    Cancer Mother     Cancer Father      Social History   Substance Use Topics    Smoking status: Never Smoker    Smokeless tobacco: Never Used    Alcohol use No     Patient Active Problem List   Diagnosis Code    Hypothyroidism E03.9    Hearing loss H91.90    PPD positive R76.11    [facility] R56.9    Cataract H26.9    Osteoporosis M81.0    Mental retardation F79    Abnormal finding on EKG R94.31    Pneumonia J18.9       Depression Risk Factor Screening:     PHQ over the last two weeks 2/29/2016   PHQ Not Done Medical Reason (indicate in comments)   Little interest or pleasure in doing things -   Feeling down, depressed or hopeless -   Total Score PHQ 2 -     Alcohol Risk Factor Screening: You do not drink alcohol or very rarely. Functional Ability and Level of Safety:   Hearing Loss  The patient has hearing aids; won't wear them    Activities of Daily Living  The home contains: handrails and grab bars  Patient needs help with:  phone, transportation, shopping, preparing meals, laundry, housework, managing medications, managing money, dressing, bathing, hygiene, bathroom needs and walking    Fall Risk  Fall Risk Assessment, last 12 mths 8/14/2017   Able to walk? No   Fall in past 12 months? -   Fall with injury? -   Number of falls in past 12 months -   Fall Risk Score -   PE:  Visit Vitals    /71 (BP 1 Location: Right arm, BP Patient Position: Sitting)    Pulse (!) 54    Temp 98.2 °F (36.8 °C) (Oral)    Resp 16    Ht 5' 6\" (1.676 m)    SpO2 94%     General appearance: alert, cooperative, no distress, appears stated age  Neck: supple, symmetrical, trachea midline, no adenopathy, thyroid: not enlarged, symmetric, no tenderness/mass/nodules, no carotid bruit and no JVD  Lungs: clear to auscultation bilaterally  Heart: regular rate and rhythm, S1, S2 normal, no murmur, click, rub or gallop  Abdomen: soft, non-tender.  Bowel sounds normal. No masses,  no organomegaly   Extremities: extremities normal, atraumatic, no cyanosis or edema      Abuse Screen  Patient is not abused    Cognitive Screening   Evaluation of Cognitive Function:  Has your family/caregiver stated any concerns about your memory:   Abnormal    Patient Care Team   Patient Care Team:  Myla Ahumada, MD as PCP - General (Family Practice)  MD Baislio Yoder MD (Gastroenterology)  Van Granados MD (Dermatology)  Fawn Javier DPM (Podiatry)  Yesenia Huffman MD (Neurology)  Kayla Saravia MD (Orthopedic Surgery)  Meir Garcia MD (Ophthalmology)  Basilio Gore MD (Gastroenterology)  Van Granados MD (Dermatology)  Fawn Javier DPM (Podiatry)  Yesenia Huffman MD (Neurology)  Kayla Saravia MD (Orthopedic Surgery)  Meir Garcia MD (Ophthalmology)  Anup Ayala MD    Assessment/Plan   Education and counseling provided:  Cardiovascular screening blood test    Diagnoses and all orders for this visit:    1. Medicare annual wellness visit, subsequent    2. Screening for cardiovascular condition  -     LIPID PANEL; Future  -     METABOLIC PANEL, BASIC; Future    3. Screening for prostate cancer    4. Acquired hypothyroidism  -     TSH 3RD GENERATION; Future    Other orders  -     loratadine (CLARITIN) 10 mg tablet; TAKE 1 TABLET BY MOUTH ONCE DAILY  -     calcium-cholecalciferol, D3, (CALTRATE 600+D) tablet; TAKE 1 TABLET BY MOUTH TWICE DAILY  -     multivit-min-FA-lycopen-lutein (CENTRUM SILVER) 0.4-300-250 mg-mcg-mcg tab; Take 1 Tab by mouth daily. -     levothyroxine (SYNTHROID) 88 mcg tablet; TAKE 1 TABLET BY MOUTH DAILY BEFORE BREAKFAST  -     megestrol (MEGACE) 40 mg tablet; TAKE 1 TABLET BY MOUTH ONCE DAILY  -     metoprolol tartrate (LOPRESSOR) 50 mg tablet; TAKE 1 TABLET BY MOUTH TWICE DAILY        There are no preventive care reminders to display for this patient. As above, pt well and stable  Follow-up Disposition:  Return in about 4 months (around 7/12/2018) for thyroid. An After Visit Summary was printed and given to the patient. This has been fully explained to the patient, who indicates understanding.

## 2018-03-12 NOTE — PATIENT INSTRUCTIONS
Medicare Wellness Visit, Male    The best way to live healthy is to have a healthy lifestyle by eating a well-balanced diet, exercising regularly, limiting alcohol and stopping smoking. Regular physical exams and screening tests are another way to keep healthy. Preventive exams provided by your health care provider can find health problems before they become diseases or illnesses. Preventive services including immunizations, screening tests, monitoring and exams can help you take care of your own health. All people over age 72 should have a pneumovax  and and a prevnar shot to prevent pneumonia. These are once in a lifetime unless you and your provider decide differently. All people over 65 should have a yearly flu shot and a tetanus vaccine every 10 years. Screening for diabetes mellitus with a blood sugar test should be done every year. Glaucoma is a disease of the eye due to increased ocular pressure that can lead to blindness and it should be done every year by an eye professional.    Cardiovascular screening tests that check for elevated lipids (fatty part of blood) which can lead to heart disease and strokes should be done every 5 years. Colorectal screening that evaluates for blood or polyps in your colon should be done yearly as a stool test or every five years as a flexible sigmoidoscope or every 10 years as a colonoscopy up to age 76. Men up to age 76 may need a screening blood test for prostate cancer at certain intervals, depending on their personal and family history. This decision is between the patient and his provider. If you have been a smoker or had family history of abdominal aortic aneurysms, you and your provider may decide to schedule an ultrasound test of your aorta. Hepatitis C screening is also recommended for anyone born between [birth year] through [address]. A shingles vaccine is also recommended once in a lifetime after age 61.     Your Medicare Wellness Exam is recommended annually. Here is a list of your current Health Maintenance items with a due date:  Health Maintenance Due   Topic Date Due    Annual Well Visit  03/07/2018          Well Visit, Over 72: Care Instructions  Your Care Instructions    Physical exams can help you stay healthy. Your doctor has checked your overall health and may have suggested ways to take good care of yourself. He or she also may have recommended tests. At home, you can help prevent illness with healthy eating, regular exercise, and other steps. Follow-up care is a key part of your treatment and safety. Be sure to make and go to all appointments, and call your doctor if you are having problems. It's also a good idea to know your test results and keep a list of the medicines you take. How can you care for yourself at home? · Reach and stay at a healthy weight. This will lower your risk for many problems, such as obesity, diabetes, heart disease, and high blood pressure. · Get at least 30 minutes of exercise on most days of the week. Walking is a good choice. You also may want to do other activities, such as running, swimming, cycling, or playing tennis or team sports. · Do not smoke. Smoking can make health problems worse. If you need help quitting, talk to your doctor about stop-smoking programs and medicines. These can increase your chances of quitting for good. · Protect your skin from too much sun. When you're outdoors from 10 a.m. to 4 p.m., stay in the shade or cover up with clothing and a hat with a wide brim. Wear sunglasses that block UV rays. Even when it's cloudy, put broad-spectrum sunscreen (SPF 30 or higher) on any exposed skin. · See a dentist one or two times a year for checkups and to have your teeth cleaned. · Wear a seat belt in the car. · Limit alcohol to 2 drinks a day for men and 1 drink a day for women. Too much alcohol can cause health problems.   Follow your doctor's advice about when to have certain tests. These tests can spot problems early. For men and women  · Cholesterol. Your doctor will tell you how often to have this done based on your overall health and other things that can increase your risk for heart attack and stroke. · Blood pressure. Have your blood pressure checked during a routine doctor visit. Your doctor will tell you how often to check your blood pressure based on your age, your blood pressure results, and other factors. · Diabetes. Ask your doctor whether you should have tests for diabetes. · Vision. Experts recommend that you have yearly exams for glaucoma and other age-related eye problems. · Hearing. Tell your doctor if you notice any change in your hearing. You can have tests to find out how well you hear. · Colon cancer tests. Keep having colon cancer tests as your doctor recommends. You can have one of several types of tests. · Heart attack and stroke risk. At least every 4 to 6 years, you should have your risk for heart attack and stroke assessed. Your doctor uses factors such as your age, blood pressure, cholesterol, and whether you smoke or have diabetes to show what your risk for a heart attack or stroke is over the next 10 years. · Osteoporosis. Talk to your doctor about whether you should have a bone density test to find out whether you have thinning bones. Also ask your doctor about whether you should take calcium and vitamin D supplements. For women  · Pap test and pelvic exam. You may no longer need a Pap test. Talk with your doctor about whether to stop or continue to have Pap tests. · Breast exam and mammogram. Ask how often you should have a mammogram, which is an X-ray of your breasts. A mammogram can spot breast cancer before it can be felt and when it is easiest to treat. · Thyroid disease. Talk to your doctor about whether to have your thyroid checked as part of a regular physical exam. Women have an increased chance of a thyroid problem.   For men  · Prostate exam. Talk to your doctor about whether you should have a blood test (called a PSA test) for prostate cancer. Experts disagree on whether men should have this test. Some experts recommend that you discuss the benefits and risks of the test with your doctor. · Abdominal aortic aneurysm. Ask your doctor whether you should have a test to check for an aneurysm. You may need a test if you ever smoked or if your parent, brother, sister, or child has had an aneurysm. When should you call for help? Watch closely for changes in your health, and be sure to contact your doctor if you have any problems or symptoms that concern you. Where can you learn more? Go to http://elvia-arden.info/. Enter O853 in the search box to learn more about \"Well Visit, Over 65: Care Instructions. \"  Current as of: May 12, 2017  Content Version: 11.4  © 3234-3508 Healthwise, Incorporated. Care instructions adapted under license by [employer] (which disclaims liability or warranty for this information). If you have questions about a medical condition or this instruction, always ask your healthcare professional. [address] any warranty or liability for your use of this information.

## 2018-03-26 ENCOUNTER — TELEPHONE (OUTPATIENT)
Dept: FAMILY MEDICINE CLINIC | Age: 68
End: 2018-03-26

## 2018-03-26 NOTE — TELEPHONE ENCOUNTER
Ely Jewell with [facility] re status of Certificate of Medical Necessity for incontinent supplies faxed 03/23/2018       [phone]  Fax [phone]

## 2018-04-11 RX ORDER — INFANT FORMULA WITH IRON
POWDER (GRAM) ORAL
Qty: 113 G | Refills: 0 | Status: SHIPPED | OUTPATIENT
Start: 2018-04-11 | End: 2018-06-05 | Stop reason: SDUPTHER

## 2018-04-16 ENCOUNTER — HOSPITAL ENCOUNTER (OUTPATIENT)
Dept: INFUSION THERAPY | Age: 68
Discharge: HOME | End: 2018-04-16
Payer: MEDICARE

## 2018-04-16 VITALS
HEART RATE: 58 BPM | TEMPERATURE: 98.4 F | SYSTOLIC BLOOD PRESSURE: 113 MMHG | DIASTOLIC BLOOD PRESSURE: 66 MMHG | RESPIRATION RATE: 18 BRPM

## 2018-04-16 LAB
ANION GAP BLD CALC-SCNC: 18 MMOL/L (ref 10–20)
BUN BLD-MCNC: 16 MG/DL (ref 7–18)
CA-I BLD-MCNC: 1.35 MMOL/L (ref 1.12–1.32)
CHLORIDE BLD-SCNC: 102 MMOL/L (ref 100–108)
CO2 BLD-SCNC: 25 MMOL/L (ref 19–24)
CREAT UR-MCNC: 0.7 MG/DL (ref 0.6–1.3)
GLUCOSE BLD STRIP.AUTO-MCNC: 162 MG/DL (ref 74–106)
HCT VFR BLD CALC: 36 % (ref 36–49)
HGB BLD-MCNC: 12.2 G/DL (ref 12–16)
MAGNESIUM SERPL-MCNC: 1.7 MG/DL (ref 1.6–2.6)
PHOSPHATE SERPL-MCNC: 3.3 MG/DL (ref 2.5–4.9)
POTASSIUM BLD-SCNC: 3.5 MMOL/L (ref 3.5–5.5)
SODIUM BLD-SCNC: 141 MMOL/L (ref 136–145)

## 2018-04-16 PROCEDURE — 83735 ASSAY OF MAGNESIUM: CPT | Performed by: INTERNAL MEDICINE

## 2018-04-16 PROCEDURE — 74011250636 HC RX REV CODE- 250/636: Performed by: INTERNAL MEDICINE

## 2018-04-16 PROCEDURE — 36415 COLL VENOUS BLD VENIPUNCTURE: CPT

## 2018-04-16 PROCEDURE — 84100 ASSAY OF PHOSPHORUS: CPT | Performed by: INTERNAL MEDICINE

## 2018-04-16 PROCEDURE — 96372 THER/PROPH/DIAG INJ SC/IM: CPT

## 2018-04-16 PROCEDURE — 80047 BASIC METABLC PNL IONIZED CA: CPT

## 2018-04-16 RX ADMIN — DENOSUMAB 60 MG: 60 INJECTION SUBCUTANEOUS at 09:56

## 2018-04-16 NOTE — PROGRESS NOTES
[facility] Progress Note    Date: 2018    Name: Sisi Estrada    MRN: [id number]         : 1950      Mr. Ene Hernandez was assessed and education was provided. Mr. Jennifer Moeller vitals were reviewed and patient was observed for 5 minutes prior to treatment. Visit Vitals    /66 (BP 1 Location: Left arm, BP Patient Position: At rest;Sitting)    Pulse (!) 58    Temp 98.4 °F (36.9 °C)    Resp 18       Lab results were obtained and reviewed. Recent Results (from the past 12 hour(s))   POC CHEM8    Collection Time: 18  9:46 AM   Result Value Ref Range    CO2, POC 25 (H) 19 - 24 MMOL/L    Glucose,  (H) 74 - 106 MG/DL    BUN, POC 16 7 - 18 MG/DL    Creatinine, POC 0.7 0.6 - 1.3 MG/DL    GFRAA, POC >60 >60 ml/min/1.73m2    GFRNA, POC >60 >60 ml/min/1.73m2    Sodium,  136 - 145 MMOL/L    Potassium, POC 3.5 3.5 - 5.5 MMOL/L    Calcium, ionized (POC) 1.35 (H) 1.12 - 1.32 mmol/L    Chloride,  100 - 108 MMOL/L    Anion gap, POC 18 10 - 20      Hematocrit, POC 36 36 - 49 %    Hemoglobin, POC 12.2 12 - 16 G/DL     I-Stat, magnesium and phosphorus labwork done. Prolia 60 mg SQ given upper left arm. Mr. Ene Hernandez was discharged from [place] [address] in stable condition at 1000. He is to return on 10/15/18 at 0900 for his next appointment fir labwork and Prolia Q 6 months.     Reed Anne RN  2018  10:05 AM

## 2018-05-25 ENCOUNTER — TELEPHONE (OUTPATIENT)
Dept: ORTHOPEDIC SURGERY | Age: 68
End: 2018-05-25

## 2018-05-25 NOTE — TELEPHONE ENCOUNTER
Discussed with JAXON Elise, called Arianna--pt last seen in September--there is no new injury--pt does not need to come in--Gosia reviewed x-rays

## 2018-05-25 NOTE — TELEPHONE ENCOUNTER
Leo Arguello from [facility] called to cancel pt appt for 5/25 because pt was refusing to get out of bed. Leo Arguello needs to know if she needs to reschedule his appt. Leo Arguello can be reached at [phone].

## 2018-06-05 RX ORDER — INFANT FORMULA WITH IRON
POWDER (GRAM) ORAL
Qty: 113.4 G | Refills: 0 | Status: SHIPPED | OUTPATIENT
Start: 2018-06-05 | End: 2019-01-01

## 2018-07-09 ENCOUNTER — OFFICE VISIT (OUTPATIENT)
Dept: FAMILY MEDICINE CLINIC | Age: 68
End: 2018-07-09

## 2018-07-09 VITALS
OXYGEN SATURATION: 96 % | HEIGHT: 66 IN | HEART RATE: 56 BPM | DIASTOLIC BLOOD PRESSURE: 67 MMHG | RESPIRATION RATE: 16 BRPM | SYSTOLIC BLOOD PRESSURE: 116 MMHG | TEMPERATURE: 97.9 F

## 2018-07-09 DIAGNOSIS — R13.12 OROPHARYNGEAL DYSPHAGIA: ICD-10-CM

## 2018-07-09 DIAGNOSIS — I10 ESSENTIAL HYPERTENSION: Primary | ICD-10-CM

## 2018-07-09 RX ORDER — DOCUSATE SODIUM 100 MG/1
100 CAPSULE, LIQUID FILLED ORAL
Qty: 90 CAP | Refills: 2 | Status: ON HOLD | OUTPATIENT
Start: 2018-07-09 | End: 2019-01-01 | Stop reason: SDUPTHER

## 2018-07-09 RX ORDER — IBUPROFEN 600 MG/1
600 TABLET ORAL
Qty: 20 TAB | Refills: 0 | Status: SHIPPED | OUTPATIENT
Start: 2018-07-09 | End: 2018-10-22 | Stop reason: DRUGHIGH

## 2018-07-09 NOTE — PATIENT INSTRUCTIONS
Learning About the Diet for Swallowing Problems  What are swallowing problems? Difficulty swallowing is also called dysphagia (say \"cng-GPL-edf-uh\"). It is most often a sign of a problem with your throat or esophagus. This is the tube that moves food and liquids from the back of your mouth to your stomach. Trouble swallowing can occur when the muscles and nerves that move food through the throat and esophagus are not working right. To help you swallow food, your doctor or speech therapist may advise a special dysphagia diet for you. Why is a special diet important? A dysphagia diet can help you handle some problems that can occur when it's hard to swallow food and liquids easily. These problems can include:  · Malnutrition. This means you aren't getting enough healthy foods to keep your body working well. · Dehydration. This means you aren't getting enough liquids to keep your body healthy. · Aspiration. This means that food, liquid, or saliva goes down your windpipe (trachea) into your lungs, instead of down your esophagus to your stomach. This can lead to aspiration pneumonia, which is an inflammation of the lungs. What is the dysphagia diet? In the dysphagia diet, you change the foods you eat and the liquids you drink to make it easier to swallow them. You may:  · Change the texture of the foods you eat. Your doctor or speech therapist may advise you to eat one of these types of foods:  ¨ Solid, soft foods that have been cut up into small pieces. Extra gravy or sauce can help make these foods easier to swallow. ¨ Semi-solid foods, like ground meats or fruits and vegetables that are mashed with a fork. These foods require some chewing. Extra gravy or sauce is often served. ¨ Foods that are the texture of pudding. You don't have to chew these foods. Examples include mashed potatoes with gravy; yogurt; pudding; and pureed meats, fruits, and vegetables. · Thicken the liquids you drink.  Your doctor or speech therapist will tell you what kind of thickener to use and how thick to make the liquids. ¨ Nectar-thick liquids leave a thin coating when they are poured from a spoon. ¨ Honey-thick liquids drip slowly when they are poured from a spoon. ¨ Pudding-thick liquids do not pour from a spoon. Your speech therapist will help you learn exercises to train your muscles to work together so you can swallow. You may also need to learn how to position your body or how to put food in your mouth to be able to swallow better. Some people have severe trouble swallowing and can't get enough food and liquids. In those cases, the doctor can insert a feeding tube so the person gets enough nutrients. Follow-up care is a key part of your treatment and safety. Be sure to make and go to all appointments, and call your doctor if you are having problems. It's also a good idea to know your test results and keep a list of the medicines you take. Where can you learn more? Go to http://elvia-arden.info/. Enter H256 in the search box to learn more about \"Learning About the Diet for Swallowing Problems. \"  Current as of: March 20, 2017  Content Version: 11.4  © 4232-8959 Healthwise, [employer]. Care instructions adapted under license by [employer] (which disclaims liability or warranty for this information). If you have questions about a medical condition or this instruction, always ask your healthcare professional. [place] [address] any warranty or liability for your use of this information.

## 2018-07-09 NOTE — MR AVS SNAPSHOT
[facility] 
 
 
 [address] [address] [address] [facility] [address] 
[phone] Patient: Ev Hills MRN: [id number] Visit Information Date & Time Provider Department Dept. Phone Encounter #  
 7/9/2018 12:00 PM Portillo Bangura, [address] [place] [address] [phone] [id number] Upcoming Health Maintenance Date Due ZOSTER VACCINE AGE 60> 12/3/2009 GLAUCOMA SCREENING Q2Y 2/3/2015 Influenza Age 5 to Adult 8/1/2018 MEDICARE YEARLY EXAM 3/13/2019 Pneumococcal 65+ Low/Medium Risk (2 of 2 - PPSV23) 7/12/2019 COLONOSCOPY 2/28/2021 DTaP/Tdap/Td series (3 - Td) 2/28/2026 Allergies as of 7/9/2018  Review Complete On: 7/9/2018 By: Efrain Jewell LPN Severity Noted Reaction Type Reactions Levaquin [Levofloxacin]  03/16/2011    Rash Other reaction(s): mild rash/itching Other Medication  12/10/2009    Other (comments) Bee stings PPD- skin test  
 Pcn [Penicillins]  02/22/2010    Unable to Obtain Other reaction(s): unknown Tuberculin, Old Skin Test  08/22/2014    Other (comments) Venom-honey Bee  08/22/2014 Other reaction(s): anaphylaxis/angioedema, swelling Current Immunizations  Reviewed on 4/16/2018 Name Date Influenza High Dose Vaccine PF 11/14/2017, 11/22/2016, 12/7/2015 Influenza Vaccine 9/10/2014, 3/24/2014 Influenza Vaccine Split 12/10/2009 Pneumococcal Conjugate (PCV-13) 12/7/2015 Pneumococcal Polysaccharide (PPSV-23) 7/12/2014  5:32 PM  
 Tdap 2/29/2016, 11/3/2011 Not reviewed this visit You Were Diagnosed With   
  
 Codes Comments Essential hypertension    -  Primary ICD-10-CM: I10 
ICD-9-CM: 401.9 Oropharyngeal dysphagia     ICD-10-CM: R13.12 
ICD-9-CM: 787.22 Vitals  BP Pulse Temp Resp Height(growth percentile) SpO2  
 116/67 (BP 1 Location: Right arm, BP Patient Position: Sitting) (!) 56 97.9 °F (36.6 °C) (Oral) 16 5' 6\" (1.676 m) 96% Smoking Status Never Smoker Preferred Pharmacy Pharmacy Name Phone [facility] [address] [phone] Your Updated Medication List  
  
   
This list is accurate as of 7/9/18  1:15 PM.  Always use your most recent med list.  
  
  
  
  
 acetaminophen 500 mg tablet Commonly known as:  TYLENOL Take  by mouth every six (6) hours as needed for Pain. calcium-cholecalciferol (D3) tablet Commonly known as:  CALTRATE 600+D TAKE 1 TABLET BY MOUTH TWICE DAILY docusate sodium 100 mg capsule Commonly known as:  Meir El Take 1 Cap by mouth nightly as needed for Constipation. EPIPEN 2-RAJNI 0.3 mg/0.3 mL injection Generic drug:  EPINEPHrine INJECT I.M. 0.3ML ONCE AS NEEDED FOR BEE STINGS  
  
 felbamate 600 mg tablet Commonly known as:  Melo Buddy Take 1 Tab by mouth four (4) times daily. FLONASE 50 mcg/actuation nasal spray Generic drug:  fluticasone 2 Sprays by Both Nostrils route daily. food supplemt, lactose-reduced Liqd Commonly known as:  ENSURE Take 237 mL by mouth four (4) times daily. ibuprofen 600 mg tablet Commonly known as:  MOTRIN Take 1 Tab by mouth every six (6) hours as needed for Pain.  
  
 levothyroxine 88 mcg tablet Commonly known as:  SYNTHROID  
TAKE 1 TABLET BY MOUTH DAILY BEFORE BREAKFAST  
  
 loratadine 10 mg tablet Commonly known as:  CLARITIN  
TAKE 1 TABLET BY MOUTH ONCE DAILY LORazepam 0.5 mg tablet Commonly known as:  ATIVAN Take  by mouth nightly. megestrol 40 mg tablet Commonly known as:  MEGACE  
TAKE 1 TABLET BY MOUTH ONCE DAILY  
  
 metoprolol tartrate 50 mg tablet Commonly known as:  LOPRESSOR  
TAKE 1 TABLET BY MOUTH TWICE DAILY  
  
 mirtazapine 30 mg tablet Commonly known as:  Kamla Jagdish Take 30 mg by mouth two (2) times a day. multivit-min-FA-lycopen-lutein 0.4-300-250 mg-mcg-mcg Tab Commonly known as:  CENTRUM SILVER Take 1 Tab by mouth daily. polyethylene glycol 17 gram packet Commonly known as:  Kira Boer Take 1 Packet by mouth daily as needed. PROLIA 60 mg/mL injection Generic drug:  denosumab 60 mg by SubCUTAneous route every 6 months. TOPAMAX 200 mg tablet Generic drug:  topiramate Take 200 mg by mouth two (2) times a day. VIMPAT 200 mg Tab tablet Generic drug:  lacosamide Take 200 mg by mouth two (2) times a day. vitamin a & d ointment Commonly known as:  A&D Apply  to affected area as needed. vits A and D-white pet-lanolin Oint ointment Commonly known as:  A&D APPLY TO AFFECTED AREA(S) AS DIRECTED AS NEEDED Prescriptions Sent to Pharmacy Refills  
 docusate sodium (COLACE) 100 mg capsule 2 Sig: Take 1 Cap by mouth nightly as needed for Constipation. Class: Normal  
 Pharmacy: [address] Ph #: [phone] Route: Oral  
 ibuprofen (MOTRIN) 600 mg tablet 0 Sig: Take 1 Tab by mouth every six (6) hours as needed for Pain. Class: Normal  
 Pharmacy: [address] Ph #: [phone] Route: Oral  
  
To-Do List   
 07/16/2018 Imaging:  XR SWALLOW FUNC VIDEO   
  
 10/15/2018 9:00 AM  
  Appointment with [facility] FAST TRACK NURSE at [facility] ([phone]) Patient Instructions Learning About the Diet for Swallowing Problems What are swallowing problems? Difficulty swallowing is also called dysphagia (say \"yfb-UIT-tjl-uh\"). It is most often a sign of a problem with your throat or esophagus. This is the tube that moves food and liquids from the back of your mouth to your stomach.  
Trouble swallowing can occur when the muscles and nerves that move food through the throat and esophagus are not working right. To help you swallow food, your doctor or speech therapist may advise a special dysphagia diet for you. Why is a special diet important? A dysphagia diet can help you handle some problems that can occur when it's hard to swallow food and liquids easily. These problems can include: · Malnutrition. This means you aren't getting enough healthy foods to keep your body working well. · Dehydration. This means you aren't getting enough liquids to keep your body healthy. · Aspiration. This means that food, liquid, or saliva goes down your windpipe (trachea) into your lungs, instead of down your esophagus to your stomach. This can lead to aspiration pneumonia, which is an inflammation of the lungs. What is the dysphagia diet? In the dysphagia diet, you change the foods you eat and the liquids you drink to make it easier to swallow them. You may: · Change the texture of the foods you eat. Your doctor or speech therapist may advise you to eat one of these types of foods: 
¨ Solid, soft foods that have been cut up into small pieces. Extra gravy or sauce can help make these foods easier to swallow. ¨ Semi-solid foods, like ground meats or fruits and vegetables that are mashed with a fork. These foods require some chewing. Extra gravy or sauce is often served. ¨ Foods that are the texture of pudding. You don't have to chew these foods. Examples include mashed potatoes with gravy; yogurt; pudding; and pureed meats, fruits, and vegetables. · Thicken the liquids you drink. Your doctor or speech therapist will tell you what kind of thickener to use and how thick to make the liquids. ¨ Nectar-thick liquids leave a thin coating when they are poured from a spoon. ¨ Honey-thick liquids drip slowly when they are poured from a spoon. ¨ Pudding-thick liquids do not pour from a spoon.  
Your speech therapist will help you learn exercises to train your muscles to work together so you can swallow. You may also need to learn how to position your body or how to put food in your mouth to be able to swallow better. Some people have severe trouble swallowing and can't get enough food and liquids. In those cases, the doctor can insert a feeding tube so the person gets enough nutrients. Follow-up care is a key part of your treatment and safety. Be sure to make and go to all appointments, and call your doctor if you are having problems. It's also a good idea to know your test results and keep a list of the medicines you take. Where can you learn more? Go to http://elvia-arden.info/. Enter U163 in the search box to learn more about \"Learning About the Diet for Swallowing Problems. \" Current as of: March 20, 2017 Content Version: 11.4 © 7649-3865 Healthwise, [employer]. Care instructions adapted under license by [employer] (which disclaims liability or warranty for this information). If you have questions about a medical condition or this instruction, always ask your healthcare professional. [address] any warranty or liability for your use of this information. Introducing [facility]! Dear Lori Hernandez: 
Thank you for requesting a Minuum account. Our records indicate that you already have an active Minuum account. You can access your account anytime at [email or web address] Did you know that you can access your hospital and ER discharge instructions at any time in Minuum? You can also review all of your test results from your hospital stay or ER visit. Additional Information If you have questions, please visit the Frequently Asked Questions section of the Minuum website at [email or web address] Remember, Minuum is NOT to be used for urgent needs. For medical emergencies, dial 911. Now available from your iPhone and Android! Please provide this summary of care documentation to your next provider. Your primary care clinician is listed as [facility]. If you have any questions after today's visit, please call [phone].

## 2018-07-09 NOTE — PROGRESS NOTES
HISTORY OF PRESENT ILLNESS  Teodoro Diaz is a 76 y.o. male. HPI  Patient is here today for evaluation and treatment of: Hypertension/Thyroid    Hypertension:  BP is stable. He is on meds as listed below. Thyroid:   Pt is here with his counselor;  Per the counselor, Pt seems to cough while drinking water but also has a baseline cough as well;  Was advised to have pt be considered for  a barium swallowing test per his . No regurgitation; no fever. Current Outpatient Prescriptions:     vits A and D-white pet-lanolin (A&D) oint ointment, APPLY TO AFFECTED AREA(S) AS DIRECTED AS NEEDED, Disp: 113.4 g, Rfl: 0    loratadine (CLARITIN) 10 mg tablet, TAKE 1 TABLET BY MOUTH ONCE DAILY, Disp: 30 Tab, Rfl: 6    calcium-cholecalciferol, D3, (CALTRATE 600+D) tablet, TAKE 1 TABLET BY MOUTH TWICE DAILY, Disp: 60 Tab, Rfl: 6    multivit-min-FA-lycopen-lutein (CENTRUM SILVER) 0.4-300-250 mg-mcg-mcg tab, Take 1 Tab by mouth daily. , Disp: 30 Tab, Rfl: 11    levothyroxine (SYNTHROID) 88 mcg tablet, TAKE 1 TABLET BY MOUTH DAILY BEFORE BREAKFAST, Disp: 30 Tab, Rfl: 6    megestrol (MEGACE) 40 mg tablet, TAKE 1 TABLET BY MOUTH ONCE DAILY, Disp: 30 Tab, Rfl: 6    metoprolol tartrate (LOPRESSOR) 50 mg tablet, TAKE 1 TABLET BY MOUTH TWICE DAILY, Disp: 60 Tab, Rfl: 6    EPIPEN 2-RAJNI 0.3 mg/0.3 mL injection, INJECT I.M. 0.3ML ONCE AS NEEDED FOR BEE STINGS, Disp: 2 Syringe, Rfl: 0    denosumab (PROLIA) 60 mg/mL injection, 60 mg by SubCUTAneous route every 6 months., Disp: , Rfl:     fluticasone (FLONASE) 50 mcg/actuation nasal spray, 2 Sprays by Both Nostrils route daily. , Disp: , Rfl:     acetaminophen (TYLENOL) 500 mg tablet, Take  by mouth every six (6) hours as needed for Pain., Disp: , Rfl:     docusate sodium (COLACE) 100 mg capsule, Take 1 Cap by mouth nightly as needed for Constipation. , Disp: 90 Cap, Rfl: 2    ibuprofen (MOTRIN) 600 mg tablet, Take 1 Tab by mouth every six (6) hours as needed for Pain., Disp: 20 Tab, Rfl: 0    vitamin a & d (A&D) ointment, Apply  to affected area as needed. , Disp: 60 g, Rfl: 6    polyethylene glycol (MIRALAX) 17 gram packet, Take 1 Packet by mouth daily as needed. , Disp: 30 Each, Rfl: 6    LORazepam (ATIVAN) 0.5 mg tablet, Take  by mouth nightly., Disp: , Rfl:     mirtazapine (REMERON) 30 mg tablet, Take 30 mg by mouth two (2) times a day., Disp: , Rfl:     food supplement, lactose-free (ENSURE) liqd, Take 237 mL by mouth four (4) times daily. , Disp: 1000 mL, Rfl: 3    lacosamide (VIMPAT) 200 mg Tab tablet, Take 200 mg by mouth two (2) times a day., Disp: , Rfl:     felbamate (FELBATOL) 600 mg tablet, Take 1 Tab by mouth four (4) times daily. , Disp: 120 Tab, Rfl: 6    topiramate (TOPAMAX) 200 mg tablet, Take 200 mg by mouth two (2) times a day., Disp: , Rfl:       PMH,  Meds, Allergies, Family History, Social history reviewed    Review of Systems   Constitutional: Negative for chills and fever. Cardiovascular: Negative for chest pain and orthopnea. Physical Exam   Visit Vitals    /67 (BP 1 Location: Right arm, BP Patient Position: Sitting)    Pulse (!) 56    Temp 97.9 °F (36.6 °C) (Oral)    Resp 16    Ht 5' 6\" (1.676 m)    SpO2 96%     General appearance: alert, cooperative, no distress, appears stated age  Lungs: clear to auscultation bilaterally  Heart: regular rate and rhythm, S1, S2 normal, no murmur, click, rub or gallop    Extremities: extremities normal, atraumatic, no cyanosis or edema  In wheelchair - non verbal      ASSESSMENT and PLAN    ICD-10-CM ICD-9-CM    1. Essential hypertension I10 401.9    2. Oropharyngeal dysphagia- new R13.12 787.22 XR SWALLOW FUNC VIDEO       As above,   above all stable unless otherwise noted  Refilled meds needed  Follow-up Disposition:  Return in about 4 months (around 11/9/2018) for htn, dysphagia.

## 2018-07-09 NOTE — PROGRESS NOTES
1. Have you been to the ER, urgent care clinic since your last visit? Hospitalized since your last visit? No    2. Have you seen or consulted any other health care providers outside of the [facility] since your last visit? Include any pap smears or colon screening.  No

## 2018-08-09 ENCOUNTER — HOSPITAL ENCOUNTER (OUTPATIENT)
Dept: GENERAL RADIOLOGY | Age: 68
Discharge: HOME | End: 2018-08-09
Attending: FAMILY MEDICINE
Payer: MEDICARE

## 2018-08-09 DIAGNOSIS — R13.12 OROPHARYNGEAL DYSPHAGIA: ICD-10-CM

## 2018-08-09 PROCEDURE — G8997 SWALLOW GOAL STATUS: HCPCS

## 2018-08-09 PROCEDURE — 74230 X-RAY XM SWLNG FUNCJ C+: CPT

## 2018-08-09 PROCEDURE — G8996 SWALLOW CURRENT STATUS: HCPCS

## 2018-08-09 PROCEDURE — G8998 SWALLOW D/C STATUS: HCPCS

## 2018-08-09 PROCEDURE — 92611 MOTION FLUOROSCOPY/SWALLOW: CPT

## 2018-08-09 PROCEDURE — 74011000255 HC RX REV CODE- 255: Performed by: FAMILY MEDICINE

## 2018-08-09 RX ADMIN — BARIUM SULFATE 30 ML: 400 PASTE ORAL at 14:00

## 2018-08-09 RX ADMIN — BARIUM SULFATE 60 G: 960 POWDER, FOR SUSPENSION ORAL at 14:00

## 2018-08-09 RX ADMIN — BARIUM SULFATE 700 MG: 700 TABLET ORAL at 14:00

## 2018-08-09 NOTE — PROGRESS NOTES
Outpatient Modified Barium Swallow Evaluation    Patient: Catie Tyson (37 y.o. male)  Date: 8/9/2018  Primary Diagnosis: Oropharyngeal dysphagia [R13.12]        Precautions: aspiration       Videofluoroscopy Results  MBS completed with results yielding oropharyngeal swallow fxn to be essentially [facility]. Pt tolerated reg solid, puree, thin liquid +/- straw, and 13 mm Ba pill with thin liquid wash without aspiration/penetration events. Bolus manipulation, tongue base retraction, and laryngeal elevation were functional and timely throughout the study. Reduced opening of upper esophageal sphincter, i.e., impaired relaxation of cricopharyngeus, also present during all trials; no residue remained secondary to this mild deficit. Positive oropharyngeal clearance appreciated. Pt with sig cough post testing during education session. Rec reg solid diet with thin liquids, aspiration precautions, oral care TID, and meds as tolerated. Pt may benefit from a GI consult to further assess esophageal motility/fxn. Results/recommendations d/w pt and caregiver with verbalized understanding. No further skilled SLP services are indicated at this time. Please re-consult if needed. Video Flouroscopic Procedures  [x] Lateral View   [] A-P View [] Scanned to level of Sternum    [x] Seated at 90 deg.   [] Other:    Presentation:    [x] Spoon   [x] Cup   [x] Straw   [] Syringe   [x] Consecutive Swallows  [] Other:    Consistencies:   [x] Ba+ liquid   [] Ba+ liquid (nectar)   [] Ba+ liquid (honey)   [x] Ba+ puree [x] Ba+ cookie [x] 13 mm Ba pill with thin Ba wash    Treatment Techniques Attempted  [] Head Turn: [] Right [] Left     [] Head Tilt: [] Right [] Left     [] Chin Down:  [] Small Sips/bites:  [] Effortful swallow:  [] Double swallow:  [] Other:    Results  Dysphagia Present:     [] Yes  [x] No    Ratings of Dysphagia:     [] Mild  [] Moderate  [] Severe    Stages of Breakdown:   [] Oral  [] Pharyngeal  [] Esophageal - questionable, would require further examination    Aspiration:   [] Yes    [] No  [] At Risk     Cough: [] Yes      [] No     Penetration:   [] Yes    [] No     Cough: [] Yes      [] No   [] Flash/trace   [] Mod   [] To Chords          Consistency Aspirated:   Consistency Penetrated:   [] Thin Liquid     [] Thin Liquid  [] Nectar-thick Liquid    [] Nectar-thick Liquid   [] Honey-thick Liquid    [] Honey-thick Liquid   [] Puree     [] Puree  [] Solid     [] Solid    Motility Problems with:  [] Lip Closure:    [] Mastication:   [] Bolus Formation/control:   [] A-P Transport:  [] Tongue Base Retraction:  [] Swallow Response (delayed):  [] Velopharyngeal Closure:  [] Pharyngeal Aspirations:  [] Laryngeal Elevation/adduction:  [] Epiglottic Inversion:  [] Pharyngeal motility/sensation:  [x] Cricopharyngeal Relaxation: min  [] Esophageal Peristalsis:  [] Other:    Timing of Aspiration/Penetration:  [] Before Swallow:  [] During Swallow:  [] After Swallow:    Transit Time Delay:  [] >1 Second  Oral  [] >1 Second Pharyngeal  [] >20 Second Esophageal     Residuals:  [] Vallecula:    [] Mild  [] Mod  [] Severe  [] Pyriform Sinus:   [] Mild  [] Mod  [] Severe  [] Posterior Pharyngeal Wall:  [] Mild  [] Mod  [] Severe    GCODES(GN): GCODESwallowing:  Swallow Current Status CH= 0%   Swallow Goal Status CH= 0%   Swallow D/C Status CH= 0%    Thank you for this referral.    Armando Farley M.S. CCC-SLP/L  Speech-Language Pathologist

## 2018-08-23 ENCOUNTER — TELEPHONE (OUTPATIENT)
Dept: FAMILY MEDICINE CLINIC | Age: 68
End: 2018-08-23

## 2018-08-23 NOTE — TELEPHONE ENCOUNTER
Wendi Rivera from the [facility] called to check status in regards to patients [facility] form.

## 2018-08-28 NOTE — TELEPHONE ENCOUNTER
Called Arianna with [facility] at [phone]  (non-secure line) and did not leave a detailed message. Need to confirm if form was received, if not, to please fax another form to provider.

## 2018-09-26 RX ORDER — METOPROLOL TARTRATE 50 MG/1
TABLET ORAL
Qty: 60 TAB | Refills: 0 | Status: SHIPPED | OUTPATIENT
Start: 2018-09-26 | End: 2018-10-09 | Stop reason: SDUPTHER

## 2018-09-26 RX ORDER — MEGESTROL ACETATE 40 MG/1
TABLET ORAL
Qty: 30 TAB | Refills: 0 | Status: SHIPPED | OUTPATIENT
Start: 2018-09-26 | End: 2018-10-09 | Stop reason: SDUPTHER

## 2018-09-26 RX ORDER — MULTIVITAMIN
TABLET ORAL
Qty: 60 TAB | Refills: 6 | Status: SHIPPED | OUTPATIENT
Start: 2018-09-26 | End: 2018-10-09 | Stop reason: SDUPTHER

## 2018-09-26 RX ORDER — LORATADINE 10 MG/1
TABLET ORAL
Qty: 30 TAB | Refills: 6 | Status: SHIPPED | OUTPATIENT
Start: 2018-09-26 | End: 2018-10-09 | Stop reason: ALTCHOICE

## 2018-09-26 RX ORDER — LEVOTHYROXINE SODIUM 88 UG/1
TABLET ORAL
Qty: 30 TAB | Refills: 6 | Status: SHIPPED | OUTPATIENT
Start: 2018-09-26 | End: 2018-10-09 | Stop reason: SDUPTHER

## 2018-10-09 ENCOUNTER — OFFICE VISIT (OUTPATIENT)
Dept: FAMILY MEDICINE CLINIC | Age: 68
End: 2018-10-09

## 2018-10-09 VITALS
HEIGHT: 66 IN | WEIGHT: 110 LBS | RESPIRATION RATE: 18 BRPM | BODY MASS INDEX: 17.68 KG/M2 | DIASTOLIC BLOOD PRESSURE: 66 MMHG | SYSTOLIC BLOOD PRESSURE: 108 MMHG | HEART RATE: 56 BPM | OXYGEN SATURATION: 94 % | TEMPERATURE: 98.3 F

## 2018-10-09 DIAGNOSIS — R26.9 GAIT DISTURBANCE: ICD-10-CM

## 2018-10-09 DIAGNOSIS — R13.12 OROPHARYNGEAL DYSPHAGIA: Primary | ICD-10-CM

## 2018-10-09 RX ORDER — MINERAL OIL
180 ENEMA (ML) RECTAL DAILY
Qty: 30 TAB | Refills: 6 | Status: SHIPPED | OUTPATIENT
Start: 2018-10-09 | End: 2019-01-01 | Stop reason: SDUPTHER

## 2018-10-09 RX ORDER — MEGESTROL ACETATE 40 MG/1
TABLET ORAL
Qty: 30 TAB | Refills: 1 | Status: SHIPPED | OUTPATIENT
Start: 2018-10-09 | End: 2019-01-01

## 2018-10-09 RX ORDER — ETHYL ALCOHOL 70 %
SOLUTION, NON-ORAL TOPICAL
Qty: 60 G | Refills: 6 | Status: SHIPPED | OUTPATIENT
Start: 2018-10-09 | End: 2019-01-01 | Stop reason: SDUPTHER

## 2018-10-09 RX ORDER — MULTIVITAMIN
TABLET ORAL
Qty: 60 TAB | Refills: 6 | Status: SHIPPED | OUTPATIENT
Start: 2018-10-09 | End: 2019-01-01 | Stop reason: SDUPTHER

## 2018-10-09 RX ORDER — LEVOTHYROXINE SODIUM 88 UG/1
TABLET ORAL
Qty: 30 TAB | Refills: 6 | Status: SHIPPED | OUTPATIENT
Start: 2018-10-09 | End: 2019-01-01 | Stop reason: SDUPTHER

## 2018-10-09 RX ORDER — LORATADINE 10 MG/1
TABLET ORAL
Qty: 30 TAB | Refills: 6 | Status: CANCELLED | OUTPATIENT
Start: 2018-10-09

## 2018-10-09 RX ORDER — METOPROLOL TARTRATE 50 MG/1
TABLET ORAL
Qty: 60 TAB | Refills: 6 | Status: SHIPPED | OUTPATIENT
Start: 2018-10-09 | End: 2019-01-01 | Stop reason: SDUPTHER

## 2018-10-09 NOTE — MR AVS SNAPSHOT
[address] [place] [address] 
 
 
 [address] [address] [address] [facility] [address] 
[phone] Patient: Leighton Bustamante MRN: [id number] [facility]:[birth year] Visit Information Date & Time Provider Department Dept. Phone Encounter #  
 10/9/2018 12:00 PM Etienne Robertson, [address] [place] [address] [phone] [id number] Follow-up Instructions Return in about 4 months (around 2/9/2019) for htn. Upcoming Health Maintenance Date Due Shingrix Vaccine Age 50> (1 of 2) 2/3/2000 GLAUCOMA SCREENING Q2Y 2/3/2015 Influenza Age 5 to Adult 8/1/2018 MEDICARE YEARLY EXAM 3/13/2019 Pneumococcal 65+ Low/Medium Risk (2 of 2 - PPSV23) 7/12/2019 COLONOSCOPY 2/28/2021 DTaP/Tdap/Td series (3 - Td) 2/28/2026 Allergies as of 10/9/2018  Review Complete On: 10/9/2018 By: Etienne Robertson MD  
  
 Severity Noted Reaction Type Reactions Levaquin [Levofloxacin]  03/16/2011    Rash Other reaction(s): mild rash/itching Other Medication  12/10/2009    Other (comments) Bee stings PPD- skin test  
 Pcn [Penicillins]  02/22/2010    Unable to Obtain Other reaction(s): unknown Tuberculin, Old Skin Test  08/22/2014    Other (comments) Venom-honey Bee  08/22/2014 Other reaction(s): anaphylaxis/angioedema, swelling Current Immunizations  Reviewed on 4/16/2018 Name Date Influenza High Dose Vaccine PF 11/14/2017, 11/22/2016, 12/7/2015 Influenza Vaccine 9/10/2014, 3/24/2014 Influenza Vaccine Split 12/10/2009 Pneumococcal Conjugate (PCV-13) 12/7/2015 Pneumococcal Polysaccharide (PPSV-23) 7/12/2014  5:32 PM  
 Tdap 2/29/2016, 11/3/2011 Not reviewed this visit You Were Diagnosed With   
  
 Codes Comments Oropharyngeal dysphagia    -  Primary ICD-10-CM: R13.12 
ICD-9-CM: 787.22 Gait disturbance     ICD-10-CM: R26.9 ICD-9-CM: 932. 2 Vitals BP Pulse Temp Resp Height(growth percentile) Weight(growth percentile) 108/66 (BP 1 Location: Left arm, BP Patient Position: Sitting) (!) 56 98.3 °F (36.8 °C) (Oral) 18 5' 6\" (1.676 m) 110 lb (49.9 kg) SpO2 BMI Smoking Status 94% 17.75 kg/m2 Never Smoker BMI and BSA Data Body Mass Index Body Surface Area 17.75 kg/m 2 1.52 m 2 Preferred Pharmacy Pharmacy Name Phone [facility] [address] [address] [facility] 2/3 [phone] Your Updated Medication List  
  
   
This list is accurate as of 10/9/18  1:16 PM.  Always use your most recent med list.  
  
  
  
  
 acetaminophen 500 mg tablet Commonly known as:  TYLENOL Take  by mouth every six (6) hours as needed for Pain. calcium-cholecalciferol (D3) tablet Commonly known as:  CALTRATE 600+D TAKE 1 TABLET BY MOUTH TWICE DAILY docusate sodium 100 mg capsule Commonly known as:  Amando Kumarcock Take 1 Cap by mouth nightly as needed for Constipation. EPIPEN 2-RAJNI 0.3 mg/0.3 mL injection Generic drug:  EPINEPHrine INJECT I.M. 0.3ML ONCE AS NEEDED FOR BEE STINGS  
  
 felbamate 600 mg tablet Commonly known as:  Alphonza Dirk Take 1 Tab by mouth four (4) times daily. fexofenadine 180 mg tablet Commonly known as:  Armando Cho Take 1 Tab by mouth daily. FLONASE 50 mcg/actuation nasal spray Generic drug:  fluticasone 2 Sprays by Both Nostrils route daily. food supplemt, lactose-reduced Liqd Commonly known as:  ENSURE Take 237 mL by mouth four (4) times daily. ibuprofen 600 mg tablet Commonly known as:  MOTRIN Take 1 Tab by mouth every six (6) hours as needed for Pain.  
  
 levothyroxine 88 mcg tablet Commonly known as:  SYNTHROID  
TAKE 1 TABLET BY MOUTH DAILY BEFORE BREAKFAST LORazepam 0.5 mg tablet Commonly known as:  ATIVAN Take  by mouth nightly. * megestrol 40 mg tablet Commonly known as:  MEGACE  
TAKE 1 TABLET BY MOUTH ONCE DAILY * megestrol 40 mg tablet Commonly known as:  MEGACE  
TAKE 1 TABLET BY MOUTH ONCE DAILY  
  
 metoprolol tartrate 50 mg tablet Commonly known as:  LOPRESSOR  
TAKE 1 TABLET BY MOUTH TWICE DAILY  
  
 mirtazapine 30 mg tablet Commonly known as:  Skippy Genin Take 30 mg by mouth two (2) times a day. multivit-min-FA-lycopen-lutein 0.4-300-250 mg-mcg-mcg Tab Commonly known as:  CENTRUM SILVER Take 1 Tab by mouth daily. polyethylene glycol 17 gram packet Commonly known as:  Mirza Sequin Take 1 Packet by mouth daily as needed. PROLIA 60 mg/mL injection Generic drug:  denosumab 60 mg by SubCUTAneous route every 6 months. TOPAMAX 200 mg tablet Generic drug:  topiramate Take 200 mg by mouth two (2) times a day. VIMPAT 200 mg Tab tablet Generic drug:  lacosamide Take 200 mg by mouth two (2) times a day. vitamin a & d ointment Commonly known as:  A&D Apply to affected areas of the buttock as needed with  brief changes  
  
 vits A and D-white pet-lanolin Oint ointment Commonly known as:  A&D APPLY TO AFFECTED AREA(S) AS DIRECTED AS NEEDED * Notice: This list has 2 medication(s) that are the same as other medications prescribed for you. Read the directions carefully, and ask your doctor or other care provider to review them with you. Prescriptions Sent to Pharmacy Refills  
 levothyroxine (SYNTHROID) 88 mcg tablet 6 Sig: TAKE 1 TABLET BY MOUTH DAILY BEFORE BREAKFAST Class: Normal  
 Pharmacy: [facility] [address] [place] [facility] Ph #: [phone]  
 megestrol (MEGACE) 40 mg tablet 1 Sig: TAKE 1 TABLET BY MOUTH ONCE DAILY Class: Normal  
 Pharmacy: [facility] [address] [place] [facility] Ph #: [phone]  
 metoprolol tartrate (LOPRESSOR) 50 mg tablet 6 Sig: TAKE 1 TABLET BY MOUTH TWICE DAILY  Class: Normal  
 Pharmacy: [facility] [address], [address] [facility] 2[facility]3 Ph #: [phone]  
 calcium-cholecalciferol, D3, (CALTRATE 600+D) tablet 6 Sig: TAKE 1 TABLET BY MOUTH TWICE DAILY Class: Normal  
 Pharmacy: [facility] [address] [facility] Ph #: [phone]  
 vitamin a & d (A&D) ointment 6 Sig: Apply to affected areas of the buttock as needed with  brief changes Class: Normal  
 Pharmacy: [facility] [address] [place] [facility] Ph #: [phone]  
 fexofenadine (ALLEGRA) 180 mg tablet 6 Sig: Take 1 Tab by mouth daily. Class: Normal  
 Pharmacy: [facility] [address], [address] [facility] 2/3 Ph #: [phone] Route: Oral  
  
We Performed the Following AMB SUPPLY ORDER [[id number] Custom] Comments:  
 Sliding shower chair REFERRAL TO GASTROENTEROLOGY [EIT33 Custom] Follow-up Instructions Return in about 4 months (around 2/9/2019) for htn. To-Do List   
 10/15/2018  9:00 AM  
  Appointment with [facility] FAST TRACK NURSE at [facility]ION ([phone]) Referral Information Referral ID Referred By Referred To  
  
 [id number] Chelly Reynaga MD   
   [address] [place] [address] Drive [address] [facility], [address] [place] [address]. Phone: [phone] Fax: [phone] Visits Status Start Date End Date 1 New Request 10/9/18 10/9/19 If your referral has a status of pending review or denied, additional information will be sent to support the outcome of this decision. Patient Instructions Hypothyroidism: Care Instructions Your Care Instructions You have hypothyroidism, which means that your body is not making enough thyroid hormone. This hormone helps your body use energy. If your thyroid level is low, you may feel tired, be constipated, have an increase in your blood pressure, or have dry skin or memory problems.  You may also get cold easily, even when it is warm. Women with low thyroid levels may have heavy menstrual periods. A blood test to find your thyroid-stimulating hormone (TSH) level is used to check for hypothyroidism. A high TSH level may mean that you have low thyroid. When your body is not making enough thyroid hormone, TSH levels rise in an effort to make the body produce more. The treatment for hypothyroidism is to take thyroid hormone pills. You should start to feel better in 1 to 2 weeks. But it can take several months to see changes in the TSH level. You will need regular visits with your doctor to make sure you have the right dose of medicine. Most people need treatment for the rest of their lives. You will need to see your doctor regularly to have blood tests and to make sure you are doing well. Follow-up care is a key part of your treatment and safety. Be sure to make and go to all appointments, and call your doctor if you are having problems. It's also a good idea to know your test results and keep a list of the medicines you take. How can you care for yourself at home? · Take your thyroid hormone medicine exactly as prescribed. Call your doctor if you think you are having a problem with your medicine. Most people do not have side effects if they take the right amount of medicine regularly. ¨ Take the medicine 30 minutes before breakfast, and do not take it with calcium, vitamins, or iron. ¨ Do not take extra doses of your thyroid medicine. It will not help you get better any faster, and it may cause side effects. ¨ If you forget to take a dose, do NOT take a double dose of medicine. Take your usual dose the next day. · Tell your doctor about all prescription, herbal, or over-the-counter products you take. · Take care of yourself. Eat a healthy diet, get enough sleep, and get regular exercise. When should you call for help? Call 911 anytime you think you may need emergency care. For example, call if:   · You passed out (lost consciousness).  
  · You have severe trouble breathing.  
  · You have a very slow heartbeat (less than 60 beats a minute).  
  · You have a low body temperature (95°F or below).  
 Call your doctor now or seek immediate medical care if: 
  · You feel tired, sluggish, or weak.  
  · You have trouble remembering things or concentrating.  
  · You do not begin to feel better 2 weeks after starting your medicine.  
 Watch closely for changes in your health, and be sure to contact your doctor if you have any problems. Where can you learn more? Go to http://elvia-arden.info/. Enter P398 in the search box to learn more about \"Hypothyroidism: Care Instructions. \" Current as of: March 15, 2018 Content Version: 11.8 © 0181-5087 [employer]. Care instructions adapted under license by [employer] (which disclaims liability or warranty for this information). If you have questions about a medical condition or this instruction, always ask your healthcare professional. [address] [address] any warranty or liability for your use of this information. Introducing [facility]! Dear Paty Garza: 
Thank you for requesting a Munchkin account. Our records indicate that you already have an active Munchkin account. You can access your account anytime at [email or web address] Did you know that you can access your hospital and ER discharge instructions at any time in Munchkin? You can also review all of your test results from your hospital stay or ER visit. Additional Information If you have questions, please visit the Frequently Asked Questions section of the Munchkin website at [email or web address] Remember, Munchkin is NOT to be used for urgent needs. For medical emergencies, dial 911. Now available from your iPhone and Android! Please provide this summary of care documentation to your next provider. Your primary care clinician is listed as [address]. If you have any questions after today's visit, please call [phone].

## 2018-10-09 NOTE — PROGRESS NOTES
HISTORY OF PRESENT ILLNESS  Rahel Hancock is a 76 y.o. male. HPI   Pt is here to follow up on the dysphagia, c/o allergy  Per his counselor pt has clear rhinnorhea mostly daily. It is unclear if he is getting flonase. He is on claritin. Pt has had a swallowing eval. There is no aspiration. Requests a sliding shower chair. Review of Systems   Constitutional: Negative for chills and fever. Respiratory: Negative for shortness of breath and wheezing. Cardiovascular: Negative for chest pain and palpitations. Physical Exam   Constitutional: He appears well-developed and well-nourished. No distress. Cardiovascular: Normal rate and regular rhythm. Exam reveals no gallop and no friction rub. No murmur heard. Pulmonary/Chest: Breath sounds normal. No respiratory distress. He has no wheezes. Nursing note and vitals reviewed. Visit Vitals    /66 (BP 1 Location: Left arm, BP Patient Position: Sitting)    Pulse (!) 56    Temp 98.3 °F (36.8 °C) (Oral)    Resp 18    Ht 5' 6\" (1.676 m)    Wt 110 lb (49.9 kg)    SpO2 94%    BMI 17.75 kg/m2         ASSESSMENT and PLAN    ICD-10-CM ICD-9-CM    1. Oropharyngeal dysphagia R13.12 787.22 REFERRAL TO GASTROENTEROLOGY   2. Gait disturbance R26.9 781.2 AMB SUPPLY ORDER       Check flonase status  Change to [employer] chair  Check labs from endocrine  Follow-up Disposition:  Return in about 4 months (around 2/9/2019) for htn. An After Visit Summary was printed and given to the patient. This has been fully explained to the patient, who indicates understanding.

## 2018-10-09 NOTE — PATIENT INSTRUCTIONS
Hypothyroidism: Care Instructions  Your Care Instructions    You have hypothyroidism, which means that your body is not making enough thyroid hormone. This hormone helps your body use energy. If your thyroid level is low, you may feel tired, be constipated, have an increase in your blood pressure, or have dry skin or memory problems. You may also get cold easily, even when it is warm. Women with low thyroid levels may have heavy menstrual periods. A blood test to find your thyroid-stimulating hormone (TSH) level is used to check for hypothyroidism. A high TSH level may mean that you have low thyroid. When your body is not making enough thyroid hormone, TSH levels rise in an effort to make the body produce more. The treatment for hypothyroidism is to take thyroid hormone pills. You should start to feel better in 1 to 2 weeks. But it can take several months to see changes in the TSH level. You will need regular visits with your doctor to make sure you have the right dose of medicine. Most people need treatment for the rest of their lives. You will need to see your doctor regularly to have blood tests and to make sure you are doing well. Follow-up care is a key part of your treatment and safety. Be sure to make and go to all appointments, and call your doctor if you are having problems. It's also a good idea to know your test results and keep a list of the medicines you take. How can you care for yourself at home? · Take your thyroid hormone medicine exactly as prescribed. Call your doctor if you think you are having a problem with your medicine. Most people do not have side effects if they take the right amount of medicine regularly. ¨ Take the medicine 30 minutes before breakfast, and do not take it with calcium, vitamins, or iron. ¨ Do not take extra doses of your thyroid medicine. It will not help you get better any faster, and it may cause side effects.   ¨ If you forget to take a dose, do NOT take a double dose of medicine. Take your usual dose the next day. · Tell your doctor about all prescription, herbal, or over-the-counter products you take. · Take care of yourself. Eat a healthy diet, get enough sleep, and get regular exercise. When should you call for help? Call 911 anytime you think you may need emergency care. For example, call if:    · You passed out (lost consciousness).     · You have severe trouble breathing.     · You have a very slow heartbeat (less than 60 beats a minute).     · You have a low body temperature (95°F or below).    Call your doctor now or seek immediate medical care if:    · You feel tired, sluggish, or weak.     · You have trouble remembering things or concentrating.     · You do not begin to feel better 2 weeks after starting your medicine.    Watch closely for changes in your health, and be sure to contact your doctor if you have any problems. Where can you learn more? Go to http://elvia-arden.info/. Enter H975 in the search box to learn more about \"Hypothyroidism: Care Instructions. \"  Current as of: March 15, 2018  Content Version: 11.8  © 0371-1757 Healthwise, Incorporated. Care instructions adapted under license by [employer] (which disclaims liability or warranty for this information). If you have questions about a medical condition or this instruction, always ask your healthcare professional. [address] any warranty or liability for your use of this information.

## 2018-10-09 NOTE — PROGRESS NOTES
Chief Complaint   Patient presents with    Hypertension    Medication Refill     1. Have you been to the ER, urgent care clinic since your last visit? Hospitalized since your last visit? No    2. Have you seen or consulted any other health care providers outside of the [address] [place] [address] since your last visit? Include any pap smears or colon screening.  No

## 2018-10-12 NOTE — TELEPHONE ENCOUNTER
Micky Cueva called in reference to the patient DME report, she needs the provider to sign off so the insurance can pay to have [facility] send the wipes,gloves and other supplies. If there is any questions please call [phone].

## 2018-10-15 ENCOUNTER — HOSPITAL ENCOUNTER (OUTPATIENT)
Dept: INFUSION THERAPY | Age: 68
Discharge: HOME | End: 2018-10-15
Payer: MEDICARE

## 2018-10-23 ENCOUNTER — HOSPITAL ENCOUNTER (OUTPATIENT)
Dept: INFUSION THERAPY | Age: 68
Discharge: HOME | End: 2018-10-23
Payer: MEDICARE

## 2018-10-23 VITALS
TEMPERATURE: 97.3 F | SYSTOLIC BLOOD PRESSURE: 114 MMHG | OXYGEN SATURATION: 95 % | DIASTOLIC BLOOD PRESSURE: 67 MMHG | RESPIRATION RATE: 18 BRPM | HEART RATE: 72 BPM

## 2018-10-23 LAB
ANION GAP BLD CALC-SCNC: 17 MMOL/L (ref 10–20)
BUN BLD-MCNC: 17 MG/DL (ref 7–18)
CA-I BLD-MCNC: 1.31 MMOL/L (ref 1.12–1.32)
CHLORIDE BLD-SCNC: 103 MMOL/L (ref 100–108)
CO2 BLD-SCNC: 27 MMOL/L (ref 19–24)
CREAT UR-MCNC: 0.7 MG/DL (ref 0.6–1.3)
GLUCOSE BLD STRIP.AUTO-MCNC: 108 MG/DL (ref 74–106)
HCT VFR BLD CALC: 37 % (ref 36–49)
HGB BLD-MCNC: 12.6 G/DL (ref 12–16)
MAGNESIUM SERPL-MCNC: 1.8 MG/DL (ref 1.6–2.6)
PHOSPHATE SERPL-MCNC: 3.3 MG/DL (ref 2.5–4.9)
POTASSIUM BLD-SCNC: 3.8 MMOL/L (ref 3.5–5.5)
SODIUM BLD-SCNC: 142 MMOL/L (ref 136–145)

## 2018-10-23 PROCEDURE — 80047 BASIC METABLC PNL IONIZED CA: CPT

## 2018-10-23 PROCEDURE — 84100 ASSAY OF PHOSPHORUS: CPT | Performed by: INTERNAL MEDICINE

## 2018-10-23 PROCEDURE — 74011250636 HC RX REV CODE- 250/636: Performed by: NURSE PRACTITIONER

## 2018-10-23 PROCEDURE — 96372 THER/PROPH/DIAG INJ SC/IM: CPT

## 2018-10-23 PROCEDURE — 83735 ASSAY OF MAGNESIUM: CPT | Performed by: INTERNAL MEDICINE

## 2018-10-23 RX ADMIN — DENOSUMAB 60 MG: 60 INJECTION SUBCUTANEOUS at 10:54

## 2018-10-23 NOTE — PROGRESS NOTES
[facility] Progress Note Date: 2018 Name: Jose Carlos Middleton MRN: [id number] : 1950 Mr. Pavel Haro was assessed and education was provided. Mr. Hsosein Paz vitals were reviewed and patient was observed for 5 minutes prior to treatment. Visit Vitals /67 Pulse 72 Temp 97.3 °F (36.3 °C) Resp 18 SpO2 95% Lab results were obtained and reviewed. Recent Results (from the past 12 hour(s)) POC CHEM8 Collection Time: 10/23/18 10:51 AM  
Result Value Ref Range CO2, POC 27 (H) 19 - 24 MMOL/L Glucose,  (H) 74 - 106 MG/DL  
 BUN, POC 17 7 - 18 MG/DL Creatinine, POC 0.7 0.6 - 1.3 MG/DL  
 GFRAA, POC >60 >60 ml/min/1.73m2 GFRNA, POC >60 >60 ml/min/1.73m2 Sodium,  136 - 145 MMOL/L Potassium, POC 3.8 3.5 - 5.5 MMOL/L Calcium, ionized (POC) 1.31 1.12 - 1.32 mmol/L Chloride,  100 - 108 MMOL/L Anion gap, POC 17 10 - 20 Hematocrit, POC 37 36 - 49 % Hemoglobin, POC 12.6 12 - 16 G/DL  
 
I-Stat, magnesium and phosphorus labwork done. Prolia 60 mg SQ given LLQ of abdomen. Mr. Pavel Haro was discharged from [place] [address] in stable condition at 1055. His caregiver will call to set up next appointment for Prolia and labs Q 6 months. Urmila Arevalo RN 2018 10:05 AM

## 2018-12-14 NOTE — TELEPHONE ENCOUNTER
Contacted Deny Sutton from [facility]; requested another form be sent with the items they are requesting. The form sent was not clear and readable. Detail Level: Zone Detail Level: Generalized

## 2019-01-01 ENCOUNTER — HOSPITAL ENCOUNTER (OUTPATIENT)
Dept: MRI IMAGING | Age: 69
Discharge: HOME | End: 2019-08-05
Attending: INTERNAL MEDICINE
Payer: MEDICARE

## 2019-01-01 ENCOUNTER — HOSPITAL ENCOUNTER (OUTPATIENT)
Dept: CT IMAGING | Age: 69
Discharge: HOME | End: 2019-04-30
Attending: INTERNAL MEDICINE
Payer: MEDICARE

## 2019-01-01 ENCOUNTER — TELEPHONE (OUTPATIENT)
Dept: FAMILY MEDICINE CLINIC | Age: 69
End: 2019-01-01

## 2019-01-01 ENCOUNTER — ANESTHESIA EVENT (OUTPATIENT)
Dept: ENDOSCOPY | Age: 69
End: 2019-01-01
Payer: MEDICARE

## 2019-01-01 ENCOUNTER — OFFICE VISIT (OUTPATIENT)
Dept: FAMILY MEDICINE CLINIC | Age: 69
End: 2019-01-01

## 2019-01-01 ENCOUNTER — PATIENT OUTREACH (OUTPATIENT)
Dept: FAMILY MEDICINE CLINIC | Age: 69
End: 2019-01-01

## 2019-01-01 ENCOUNTER — ANESTHESIA EVENT (OUTPATIENT)
Dept: ENDOSCOPY | Age: 69
End: 2019-01-01

## 2019-01-01 ENCOUNTER — HOSPITAL ENCOUNTER (OUTPATIENT)
Age: 69
Setting detail: OUTPATIENT SURGERY
Discharge: HOME HEALTH | End: 2019-10-14
Attending: INTERNAL MEDICINE | Admitting: INTERNAL MEDICINE

## 2019-01-01 ENCOUNTER — ANESTHESIA (OUTPATIENT)
Dept: ENDOSCOPY | Age: 69
End: 2019-01-01
Payer: MEDICARE

## 2019-01-01 ENCOUNTER — HOSPITAL ENCOUNTER (OUTPATIENT)
Dept: INFUSION THERAPY | Age: 69
Discharge: HOME | End: 2019-05-03

## 2019-01-01 ENCOUNTER — HOSPITAL ENCOUNTER (OUTPATIENT)
Dept: LAB | Age: 69
Discharge: HOME | End: 2019-02-13
Payer: MEDICARE

## 2019-01-01 ENCOUNTER — APPOINTMENT (OUTPATIENT)
Dept: INFUSION THERAPY | Age: 69
End: 2019-01-01

## 2019-01-01 ENCOUNTER — HOSPITAL ENCOUNTER (OUTPATIENT)
Age: 69
Setting detail: OUTPATIENT SURGERY
Discharge: HOME | End: 2019-02-08
Attending: INTERNAL MEDICINE | Admitting: INTERNAL MEDICINE
Payer: MEDICARE

## 2019-01-01 ENCOUNTER — ANESTHESIA (OUTPATIENT)
Dept: ENDOSCOPY | Age: 69
End: 2019-01-01

## 2019-01-01 ENCOUNTER — HOSPITAL ENCOUNTER (OUTPATIENT)
Dept: GENERAL RADIOLOGY | Age: 69
Discharge: HOME | End: 2019-03-06
Attending: INTERNAL MEDICINE
Payer: MEDICARE

## 2019-01-01 ENCOUNTER — HOSPITAL ENCOUNTER (OUTPATIENT)
Dept: LAB | Age: 69
Discharge: HOME | End: 2019-10-15
Payer: MEDICARE

## 2019-01-01 ENCOUNTER — HOSPITAL ENCOUNTER (OUTPATIENT)
Age: 69
Setting detail: OUTPATIENT SURGERY
Discharge: HOME | End: 2019-10-16
Attending: INTERNAL MEDICINE | Admitting: INTERNAL MEDICINE
Payer: MEDICARE

## 2019-01-01 ENCOUNTER — HOSPITAL ENCOUNTER (OUTPATIENT)
Dept: INFUSION THERAPY | Age: 69
Discharge: HOME | End: 2019-04-30

## 2019-01-01 ENCOUNTER — PATIENT OUTREACH (OUTPATIENT)
Dept: INTERNAL MEDICINE CLINIC | Age: 69
End: 2019-01-01

## 2019-01-01 VITALS
SYSTOLIC BLOOD PRESSURE: 107 MMHG | HEART RATE: 52 BPM | TEMPERATURE: 98.2 F | BODY MASS INDEX: 19.37 KG/M2 | DIASTOLIC BLOOD PRESSURE: 63 MMHG | RESPIRATION RATE: 18 BRPM | OXYGEN SATURATION: 95 % | HEIGHT: 66 IN

## 2019-01-01 VITALS
HEIGHT: 66 IN | OXYGEN SATURATION: 93 % | DIASTOLIC BLOOD PRESSURE: 57 MMHG | SYSTOLIC BLOOD PRESSURE: 95 MMHG | HEART RATE: 54 BPM | RESPIRATION RATE: 18 BRPM | TEMPERATURE: 97.5 F | BODY MASS INDEX: 22.6 KG/M2

## 2019-01-01 VITALS
OXYGEN SATURATION: 95 % | BODY MASS INDEX: 18.56 KG/M2 | RESPIRATION RATE: 20 BRPM | DIASTOLIC BLOOD PRESSURE: 58 MMHG | HEART RATE: 55 BPM | HEIGHT: 66 IN | TEMPERATURE: 97.8 F | SYSTOLIC BLOOD PRESSURE: 111 MMHG

## 2019-01-01 VITALS
SYSTOLIC BLOOD PRESSURE: 101 MMHG | RESPIRATION RATE: 18 BRPM | BODY MASS INDEX: 22.6 KG/M2 | HEIGHT: 66 IN | TEMPERATURE: 98.6 F | DIASTOLIC BLOOD PRESSURE: 61 MMHG | OXYGEN SATURATION: 95 % | HEART RATE: 48 BPM

## 2019-01-01 VITALS
WEIGHT: 120 LBS | HEART RATE: 53 BPM | BODY MASS INDEX: 19.29 KG/M2 | TEMPERATURE: 97 F | RESPIRATION RATE: 20 BRPM | OXYGEN SATURATION: 95 % | SYSTOLIC BLOOD PRESSURE: 110 MMHG | DIASTOLIC BLOOD PRESSURE: 53 MMHG | HEIGHT: 66 IN

## 2019-01-01 VITALS
RESPIRATION RATE: 14 BRPM | SYSTOLIC BLOOD PRESSURE: 127 MMHG | DIASTOLIC BLOOD PRESSURE: 70 MMHG | BODY MASS INDEX: 18.48 KG/M2 | HEIGHT: 66 IN | TEMPERATURE: 97.3 F | WEIGHT: 115 LBS | HEART RATE: 62 BPM | OXYGEN SATURATION: 99 %

## 2019-01-01 DIAGNOSIS — I10 ESSENTIAL HYPERTENSION: ICD-10-CM

## 2019-01-01 DIAGNOSIS — K44.9 PARAESOPHAGEAL HERNIA: ICD-10-CM

## 2019-01-01 DIAGNOSIS — E03.9 ACQUIRED HYPOTHYROIDISM: ICD-10-CM

## 2019-01-01 DIAGNOSIS — N28.89 RENAL MASS: Primary | ICD-10-CM

## 2019-01-01 DIAGNOSIS — R45.1 AGITATION: ICD-10-CM

## 2019-01-01 DIAGNOSIS — A04.72 C. DIFFICILE COLITIS: Primary | ICD-10-CM

## 2019-01-01 DIAGNOSIS — R13.10 DYSPHAGIA: ICD-10-CM

## 2019-01-01 DIAGNOSIS — E03.9 ACQUIRED HYPOTHYROIDISM: Primary | ICD-10-CM

## 2019-01-01 DIAGNOSIS — Z23 ENCOUNTER FOR IMMUNIZATION: ICD-10-CM

## 2019-01-01 DIAGNOSIS — K44.9 DIAPHRAGMATIC HERNIA WITHOUT OBSTRUCTION OR GANGRENE: ICD-10-CM

## 2019-01-01 DIAGNOSIS — K76.9 LIVER LESION: ICD-10-CM

## 2019-01-01 DIAGNOSIS — R73.9 ELEVATED BLOOD SUGAR: ICD-10-CM

## 2019-01-01 DIAGNOSIS — E46 MALNUTRITION (HCC): ICD-10-CM

## 2019-01-01 DIAGNOSIS — K59.09 OTHER CONSTIPATION: ICD-10-CM

## 2019-01-01 DIAGNOSIS — R33.9 URINARY RETENTION: ICD-10-CM

## 2019-01-01 DIAGNOSIS — R13.10 DYSPHAGIA, UNSPECIFIED: ICD-10-CM

## 2019-01-01 DIAGNOSIS — Z00.00 MEDICARE ANNUAL WELLNESS VISIT, SUBSEQUENT: Primary | ICD-10-CM

## 2019-01-01 DIAGNOSIS — N13.39 OTHER HYDRONEPHROSIS: ICD-10-CM

## 2019-01-01 DIAGNOSIS — K44.9 HIATAL HERNIA: ICD-10-CM

## 2019-01-01 LAB
ANION GAP SERPL CALC-SCNC: 7 MMOL/L (ref 3–18)
ANION GAP SERPL CALC-SCNC: 8 MMOL/L (ref 3–18)
BUN SERPL-MCNC: 14 MG/DL (ref 7–18)
BUN SERPL-MCNC: 23 MG/DL (ref 7–18)
BUN/CREAT SERPL: 20 (ref 12–20)
BUN/CREAT SERPL: 26 (ref 12–20)
CALCIUM SERPL-MCNC: 9.6 MG/DL (ref 8.5–10.1)
CALCIUM SERPL-MCNC: 9.7 MG/DL (ref 8.5–10.1)
CHLORIDE SERPL-SCNC: 104 MMOL/L (ref 100–111)
CHLORIDE SERPL-SCNC: 105 MMOL/L (ref 100–108)
CHOLEST SERPL-MCNC: 179 MG/DL
CO2 SERPL-SCNC: 28 MMOL/L (ref 21–32)
CO2 SERPL-SCNC: 29 MMOL/L (ref 21–32)
CREAT SERPL-MCNC: 0.71 MG/DL (ref 0.6–1.3)
CREAT SERPL-MCNC: 0.88 MG/DL (ref 0.6–1.3)
CREAT UR-MCNC: 0.7 MG/DL (ref 0.6–1.3)
CREAT UR-MCNC: 0.9 MG/DL (ref 0.6–1.3)
EST. AVERAGE GLUCOSE BLD GHB EST-MCNC: 105 MG/DL
GLUCOSE SERPL-MCNC: 123 MG/DL (ref 74–99)
GLUCOSE SERPL-MCNC: 89 MG/DL (ref 74–99)
HBA1C MFR BLD: 5.3 % (ref 4.2–5.6)
HDLC SERPL-MCNC: 58 MG/DL (ref 40–60)
HDLC SERPL: 3.1 {RATIO} (ref 0–5)
LDLC SERPL CALC-MCNC: 100.8 MG/DL (ref 0–100)
LIPID PROFILE,FLP: ABNORMAL
POTASSIUM SERPL-SCNC: 4 MMOL/L (ref 3.5–5.5)
POTASSIUM SERPL-SCNC: 4.2 MMOL/L (ref 3.5–5.5)
SODIUM SERPL-SCNC: 140 MMOL/L (ref 136–145)
SODIUM SERPL-SCNC: 141 MMOL/L (ref 136–145)
TRIGL SERPL-MCNC: 101 MG/DL (ref ?–150)
TSH SERPL DL<=0.05 MIU/L-ACNC: 1.5 UIU/ML (ref 0.36–3.74)
VLDLC SERPL CALC-MCNC: 20.2 MG/DL

## 2019-01-01 PROCEDURE — 74011250636 HC RX REV CODE- 250/636

## 2019-01-01 PROCEDURE — 84443 ASSAY THYROID STIM HORMONE: CPT

## 2019-01-01 PROCEDURE — A9577 INJ MULTIHANCE: HCPCS | Performed by: INTERNAL MEDICINE

## 2019-01-01 PROCEDURE — 76040000019: Performed by: INTERNAL MEDICINE

## 2019-01-01 PROCEDURE — 82565 ASSAY OF CREATININE: CPT

## 2019-01-01 PROCEDURE — 76060000031 HC ANESTHESIA FIRST 0.5 HR: Performed by: INTERNAL MEDICINE

## 2019-01-01 PROCEDURE — 74241 XR UPPER GI SERIES W KUB: CPT

## 2019-01-01 PROCEDURE — 80048 BASIC METABOLIC PNL TOTAL CA: CPT

## 2019-01-01 PROCEDURE — 83036 HEMOGLOBIN GLYCOSYLATED A1C: CPT

## 2019-01-01 PROCEDURE — 74011250636 HC RX REV CODE- 250/636: Performed by: NURSE ANESTHETIST, CERTIFIED REGISTERED

## 2019-01-01 PROCEDURE — 80061 LIPID PANEL: CPT

## 2019-01-01 PROCEDURE — 74011250636 HC RX REV CODE- 250/636: Performed by: INTERNAL MEDICINE

## 2019-01-01 PROCEDURE — 36415 COLL VENOUS BLD VENIPUNCTURE: CPT

## 2019-01-01 PROCEDURE — 74011000250 HC RX REV CODE- 250: Performed by: NURSE ANESTHETIST, CERTIFIED REGISTERED

## 2019-01-01 PROCEDURE — 77030018846 HC SOL IRR STRL H20 ICUM -A: Performed by: INTERNAL MEDICINE

## 2019-01-01 PROCEDURE — 74011636320 HC RX REV CODE- 636/320: Performed by: INTERNAL MEDICINE

## 2019-01-01 PROCEDURE — 74011000255 HC RX REV CODE- 255: Performed by: INTERNAL MEDICINE

## 2019-01-01 PROCEDURE — 77030019988 HC FCPS ENDOSC DISP BSC -B: Performed by: INTERNAL MEDICINE

## 2019-01-01 PROCEDURE — 74177 CT ABD & PELVIS W/CONTRAST: CPT

## 2019-01-01 PROCEDURE — 88342 IMHCHEM/IMCYTCHM 1ST ANTB: CPT

## 2019-01-01 PROCEDURE — 74183 MRI ABD W/O CNTR FLWD CNTR: CPT

## 2019-01-01 PROCEDURE — 77030008565 HC TBNG SUC IRR ERBE -B: Performed by: INTERNAL MEDICINE

## 2019-01-01 PROCEDURE — 74011000250 HC RX REV CODE- 250

## 2019-01-01 PROCEDURE — 88305 TISSUE EXAM BY PATHOLOGIST: CPT

## 2019-01-01 RX ORDER — SODIUM CHLORIDE 0.9 % (FLUSH) 0.9 %
5-40 SYRINGE (ML) INJECTION EVERY 8 HOURS
Status: DISCONTINUED | OUTPATIENT
Start: 2019-01-01 | End: 2019-01-01 | Stop reason: HOSPADM

## 2019-01-01 RX ORDER — MEGESTROL ACETATE 40 MG/1
TABLET ORAL
Qty: 30 TAB | Refills: 10 | Status: SHIPPED | OUTPATIENT
Start: 2019-01-01 | End: 2019-01-01 | Stop reason: SDUPTHER

## 2019-01-01 RX ORDER — INFANT FORMULA WITH IRON
POWDER (GRAM) ORAL
Qty: 113.4 G | Refills: 98 | Status: SHIPPED | OUTPATIENT
Start: 2019-01-01

## 2019-01-01 RX ORDER — LIDOCAINE HYDROCHLORIDE 10 MG/ML
0.1 INJECTION, SOLUTION EPIDURAL; INFILTRATION; INTRACAUDAL; PERINEURAL AS NEEDED
Status: DISCONTINUED | OUTPATIENT
Start: 2019-01-01 | End: 2019-01-01 | Stop reason: HOSPADM

## 2019-01-01 RX ORDER — SODIUM CHLORIDE 0.9 % (FLUSH) 0.9 %
5-40 SYRINGE (ML) INJECTION AS NEEDED
Status: DISCONTINUED | OUTPATIENT
Start: 2019-01-01 | End: 2019-01-01 | Stop reason: HOSPADM

## 2019-01-01 RX ORDER — SODIUM CHLORIDE, SODIUM LACTATE, POTASSIUM CHLORIDE, CALCIUM CHLORIDE 600; 310; 30; 20 MG/100ML; MG/100ML; MG/100ML; MG/100ML
50 INJECTION, SOLUTION INTRAVENOUS CONTINUOUS
Status: DISCONTINUED | OUTPATIENT
Start: 2019-01-01 | End: 2019-01-01 | Stop reason: HOSPADM

## 2019-01-01 RX ORDER — SODIUM CHLORIDE, SODIUM LACTATE, POTASSIUM CHLORIDE, CALCIUM CHLORIDE 600; 310; 30; 20 MG/100ML; MG/100ML; MG/100ML; MG/100ML
75 INJECTION, SOLUTION INTRAVENOUS CONTINUOUS
Status: DISCONTINUED | OUTPATIENT
Start: 2019-01-01 | End: 2019-01-01 | Stop reason: HOSPADM

## 2019-01-01 RX ORDER — MULTIVITAMIN
TABLET ORAL
Qty: 60 TAB | Refills: 6 | Status: SHIPPED | OUTPATIENT
Start: 2019-01-01 | End: 2019-01-01

## 2019-01-01 RX ORDER — SODIUM CHLORIDE, SODIUM LACTATE, POTASSIUM CHLORIDE, CALCIUM CHLORIDE 600; 310; 30; 20 MG/100ML; MG/100ML; MG/100ML; MG/100ML
50 INJECTION, SOLUTION INTRAVENOUS CONTINUOUS
Status: CANCELLED | OUTPATIENT
Start: 2019-01-01

## 2019-01-01 RX ORDER — SENNOSIDES 8.6 MG/1
1 TABLET ORAL 2 TIMES DAILY
Qty: 60 TAB | Refills: 2 | Status: SHIPPED | OUTPATIENT
Start: 2019-01-01 | End: 2019-01-01 | Stop reason: SDUPTHER

## 2019-01-01 RX ORDER — LEVOTHYROXINE SODIUM 88 UG/1
88 TABLET ORAL
Qty: 30 TAB | Refills: 6 | Status: CANCELLED | OUTPATIENT
Start: 2019-01-01

## 2019-01-01 RX ORDER — MEGESTROL ACETATE 40 MG/1
TABLET ORAL
Qty: 30 TAB | Refills: 0 | Status: SHIPPED | OUTPATIENT
Start: 2019-01-01 | End: 2019-01-01

## 2019-01-01 RX ORDER — LORAZEPAM 0.5 MG/1
0.5 TABLET ORAL
Qty: 30 TAB | Refills: 1 | Status: SHIPPED | OUTPATIENT
Start: 2019-01-01 | End: 2019-01-01 | Stop reason: SDUPTHER

## 2019-01-01 RX ORDER — MIRTAZAPINE 30 MG/1
30 TABLET, FILM COATED ORAL EVERY EVENING
Qty: 30 TAB | Refills: 1 | Status: SHIPPED | OUTPATIENT
Start: 2019-01-01 | End: 2019-01-01 | Stop reason: SDUPTHER

## 2019-01-01 RX ORDER — FAMOTIDINE 10 MG/ML
20 INJECTION INTRAVENOUS ONCE
Status: DISCONTINUED | OUTPATIENT
Start: 2019-01-01 | End: 2019-01-01 | Stop reason: HOSPADM

## 2019-01-01 RX ORDER — LEVOTHYROXINE SODIUM 88 UG/1
88 TABLET ORAL
Qty: 30 TAB | Refills: 6 | Status: SHIPPED | OUTPATIENT
Start: 2019-01-01

## 2019-01-01 RX ORDER — MIRTAZAPINE 30 MG/1
30 TABLET, FILM COATED ORAL EVERY EVENING
Qty: 30 TAB | Refills: 6 | Status: SHIPPED | OUTPATIENT
Start: 2019-01-01

## 2019-01-01 RX ORDER — MINERAL OIL
180 ENEMA (ML) RECTAL
Qty: 30 TAB | Refills: 6 | Status: SHIPPED | OUTPATIENT
Start: 2019-01-01

## 2019-01-01 RX ORDER — PROPOFOL 10 MG/ML
INJECTION, EMULSION INTRAVENOUS AS NEEDED
Status: DISCONTINUED | OUTPATIENT
Start: 2019-01-01 | End: 2019-01-01 | Stop reason: HOSPADM

## 2019-01-01 RX ORDER — INSULIN LISPRO 100 [IU]/ML
INJECTION, SOLUTION INTRAVENOUS; SUBCUTANEOUS ONCE
Status: DISCONTINUED | OUTPATIENT
Start: 2019-01-01 | End: 2019-01-01 | Stop reason: HOSPADM

## 2019-01-01 RX ORDER — FLUTICASONE PROPIONATE 50 MCG
2 SPRAY, SUSPENSION (ML) NASAL DAILY
Qty: 1 BOTTLE | Refills: 3 | Status: SHIPPED | OUTPATIENT
Start: 2019-01-01

## 2019-01-01 RX ORDER — SODIUM CHLORIDE 0.9 % (FLUSH) 0.9 %
5-40 SYRINGE (ML) INJECTION AS NEEDED
Status: CANCELLED | OUTPATIENT
Start: 2019-01-01

## 2019-01-01 RX ORDER — LIDOCAINE HYDROCHLORIDE 20 MG/ML
INJECTION, SOLUTION EPIDURAL; INFILTRATION; INTRACAUDAL; PERINEURAL AS NEEDED
Status: DISCONTINUED | OUTPATIENT
Start: 2019-01-01 | End: 2019-01-01 | Stop reason: HOSPADM

## 2019-01-01 RX ORDER — LORAZEPAM 0.5 MG/1
0.5 TABLET ORAL
Qty: 30 TAB | Refills: 1 | Status: SHIPPED | OUTPATIENT
Start: 2019-11-15

## 2019-01-01 RX ORDER — MEGESTROL ACETATE 40 MG/1
TABLET ORAL
Qty: 30 TAB | Refills: 6 | Status: SHIPPED | OUTPATIENT
Start: 2019-01-01

## 2019-01-01 RX ORDER — SODIUM CHLORIDE 0.9 % (FLUSH) 0.9 %
5-40 SYRINGE (ML) INJECTION EVERY 8 HOURS
Status: CANCELLED | OUTPATIENT
Start: 2019-01-01

## 2019-01-01 RX ORDER — SENNOSIDES 8.6 MG
TABLET ORAL
Qty: 60 TAB | Refills: 10 | Status: SHIPPED | OUTPATIENT
Start: 2019-01-01 | End: 2019-01-01

## 2019-01-01 RX ORDER — METOPROLOL TARTRATE 50 MG/1
TABLET ORAL
Qty: 60 TAB | Refills: 6 | Status: SHIPPED | OUTPATIENT
Start: 2019-01-01

## 2019-01-01 RX ORDER — POLYETHYLENE GLYCOL 3350 17 G/17G
17 POWDER, FOR SOLUTION ORAL DAILY
Qty: 30 EACH | Refills: 3 | Status: SHIPPED | OUTPATIENT
Start: 2019-01-01

## 2019-01-01 RX ADMIN — PROPOFOL 30 MG: 10 INJECTION, EMULSION INTRAVENOUS at 11:47

## 2019-01-01 RX ADMIN — BARIUM SULFATE 176 G: 960 POWDER, FOR SUSPENSION ORAL at 11:01

## 2019-01-01 RX ADMIN — PROPOFOL 50 MG: 10 INJECTION, EMULSION INTRAVENOUS at 11:42

## 2019-01-01 RX ADMIN — IOPAMIDOL 100 ML: 612 INJECTION, SOLUTION INTRAVENOUS at 14:03

## 2019-01-01 RX ADMIN — SODIUM CHLORIDE, SODIUM LACTATE, POTASSIUM CHLORIDE, AND CALCIUM CHLORIDE: 600; 310; 30; 20 INJECTION, SOLUTION INTRAVENOUS at 12:15

## 2019-01-01 RX ADMIN — GADOBENATE DIMEGLUMINE 15 ML: 529 INJECTION, SOLUTION INTRAVENOUS at 14:20

## 2019-01-01 RX ADMIN — SODIUM CHLORIDE, SODIUM LACTATE, POTASSIUM CHLORIDE, AND CALCIUM CHLORIDE 75 ML/HR: 600; 310; 30; 20 INJECTION, SOLUTION INTRAVENOUS at 11:10

## 2019-01-01 RX ADMIN — FAMOTIDINE: 10 INJECTION, SOLUTION INTRAVENOUS at 10:39

## 2019-01-01 RX ADMIN — SODIUM CHLORIDE, SODIUM LACTATE, POTASSIUM CHLORIDE, AND CALCIUM CHLORIDE 50 ML/HR: 600; 310; 30; 20 INJECTION, SOLUTION INTRAVENOUS at 10:39

## 2019-01-01 RX ADMIN — FAMOTIDINE 20 MG: 10 INJECTION INTRAVENOUS at 11:33

## 2019-01-01 RX ADMIN — LIDOCAINE HYDROCHLORIDE 40 MG: 20 INJECTION, SOLUTION EPIDURAL; INFILTRATION; INTRACAUDAL; PERINEURAL at 11:42

## 2019-01-01 RX ADMIN — PROPOFOL 50 MG: 10 INJECTION, EMULSION INTRAVENOUS at 12:23

## 2019-02-08 NOTE — DISCHARGE INSTRUCTIONS
Hiatal Hernia: Care Instructions  Your Care Instructions  A hiatal hernia occurs when part of the stomach bulges into the chest cavity. A hiatal hernia may allow stomach acid and juices to back up into the esophagus (acid reflux). This can cause a feeling of burning, warmth, heat, or pain behind the breastbone. This feeling may often occur after you eat, soon after you lie down, or when you bend forward, and it may come and go. You also may have a sour taste in your mouth. These symptoms are commonly known as heartburn or reflux. But not all hiatal hernias cause symptoms. Follow-up care is a key part of your treatment and safety. Be sure to make and go to all appointments, and call your doctor if you are having problems. It's also a good idea to know your test results and keep a list of the medicines you take. How can you care for yourself at home? · Take your medicines exactly as prescribed. Call your doctor if you think you are having a problem with your medicine. · Do not take aspirin or other nonsteroidal anti-inflammatory drugs (NSAIDs), such as ibuprofen (Advil, Motrin) or naproxen (Aleve), unless your doctor says it is okay. Ask your doctor what you can take for pain. · Your doctor may recommend over-the-counter medicine. For mild or occasional indigestion, antacids such as Tums, Gaviscon, Maalox, or Mylanta may help. Your doctor also may recommend over-the-counter acid reducers, such as famotidine (Pepcid AC), cimetidine (Tagamet HB), ranitidine (Zantac 75 and Zantac 150), or omeprazole (Prilosec). Read and follow all instructions on the label. If you use these medicines often, talk with your doctor. · Change your eating habits. ? It's best to eat several small meals instead of two or three large meals. ? After you eat, wait 2 to 3 hours before you lie down. Late-night snacks aren't a good idea. ? Chocolate, mint, and alcohol can make heartburn worse.  They relax the valve between the esophagus and the stomach. ? Spicy foods, foods that have a lot of acid (like tomatoes and oranges), and coffee can make heartburn symptoms worse in some people. If your symptoms are worse after you eat a certain food, you may want to stop eating that food to see if your symptoms get better. · Do not smoke or chew tobacco.  · If you get heartburn at night, raise the head of your bed 6 to 8 inches by putting the frame on blocks or placing a foam wedge under the head of your mattress. (Adding extra pillows does not work.)  · Do not wear tight clothing around your middle. · Lose weight if you need to. Losing just 5 to 10 pounds can help. When should you call for help? Call your doctor now or seek immediate medical care if:    · You have new or worse belly pain.     · You are vomiting.    Watch closely for changes in your health, and be sure to contact your doctor if:    · You have new or worse symptoms of indigestion.     · You have trouble or pain swallowing.     · You are losing weight.     · You do not get better as expected. Where can you learn more? Go to http://elvia-arden.info/. Enter Z476 in the search box to learn more about \"Hiatal Hernia: Care Instructions. \"  Current as of: March 27, 2018  Content Version: 11.9  © 4231-8763 Healthwise, Incorporated. Care instructions adapted under license by [employer] (which disclaims liability or warranty for this information). If you have questions about a medical condition or this instruction, always ask your healthcare professional. [place] [address] any warranty or liability for your use of this information. Upper GI Endoscopy: What to Expect at [address] [place] [address]  After you have an endoscopy, you will stay at the hospital or clinic for 1 to 2 hours. This will allow the medicine to wear off. You will be able to go home after your doctor or nurse checks to make sure you are not having any problems.   You may have to stay overnight if you had treatment during the test. You may have a sore throat for a day or two after the test.  This care sheet gives you a general idea about what to expect after the test.  How can you care for yourself at home? Activity  · Rest as much as you need to after you go home. · You should be able to go back to your usual activities the day after the test.  Diet  · Follow your doctor's directions for eating after the test.  · Drink plenty of fluids (unless your doctor has told you not to). Medications  · If you have a sore throat the day after the test, use an over-the-counter spray to numb your throat. Follow-up care is a key part of your treatment and safety. Be sure to make and go to all appointments, and call your doctor if you are having problems. It's also a good idea to know your test results and keep a list of the medicines you take. When should you call for help? Call 911 anytime you think you may need emergency care. For example, call if:    · You passed out (loses consciousness).     · You have trouble breathing.     · You pass maroon or bloody stools.    Call your doctor now or seek immediate medical care if:    · You have pain that does not get better after your take pain medicine.     · You have new or worse belly pain.     · You have blood in your stools.     · You are sick to your stomach and cannot keep fluids down.     · You have a fever.     · You cannot pass stools or gas.    Watch closely for changes in your health, and be sure to contact your doctor if:    · Your throat still hurts after a day or two.     · You do not get better as expected. Where can you learn more? Go to http://elvia-arden.info/. Enter [phone] in the search box to learn more about \"Upper GI Endoscopy: What to Expect at Home. \"  Current as of: March 27, 2018  Content Version: 11.9  © 6117-9879 [employer].  Care instructions adapted under license by [employer] (which disclaims liability or warranty for this information). If you have questions about a medical condition or this instruction, always ask your healthcare professional. [address] any warranty or liability for your use of this information. DISCHARGE SUMMARY from Nurse    PATIENT INSTRUCTIONS:    After general anesthesia or intravenous sedation, for 24 hours or while taking prescription Narcotics:  · Limit your activities  · Do not drive and operate hazardous machinery  · Do not make important personal or business decisions  · Do  not drink alcoholic beverages  · If you have not urinated within 8 hours after discharge, please contact your surgeon on call. Report the following to your surgeon:  · Excessive pain, swelling, redness or odor of or around the surgical area  · Temperature over 100.5  · Nausea and vomiting lasting longer than 4 hours or if unable to take medications  · Any signs of decreased circulation or nerve impairment to extremity: change in color, persistent  numbness, tingling, coldness or increase pain  · Any questions    What to do at Home:  Recommended activity: Activity as tolerated and no driving for today. *  Please give a list of your current medications to your Primary Care Provider. *  Please update this list whenever your medications are discontinued, doses are      changed, or new medications (including over-the-counter products) are added. *  Please carry medication information at all times in case of emergency situations. These are general instructions for a healthy lifestyle:    No smoking/ No tobacco products/ Avoid exposure to second hand smoke  Surgeon General's Warning:  Quitting smoking now greatly reduces serious risk to your health.     Obesity, smoking, and sedentary lifestyle greatly increases your risk for illness    A healthy diet, regular physical exercise & weight monitoring are important for maintaining a healthy lifestyle    You may be retaining fluid if you have a history of heart failure or if you experience any of the following symptoms:  Weight gain of 3 pounds or more overnight or 5 pounds in a week, increased swelling in our hands or feet or shortness of breath while lying flat in bed. Please call your doctor as soon as you notice any of these symptoms; do not wait until your next office visit. Recognize signs and symptoms of STROKE:    F-face looks uneven    A-arms unable to move or move unevenly    S-speech slurred or non-existent    T-time-call 911 as soon as signs and symptoms begin-DO NOT go       Back to bed or wait to see if you get better-TIME IS BRAIN. Warning Signs of HEART ATTACK     Call 911 if you have these symptoms:   Chest discomfort. Most heart attacks involve discomfort in the center of the chest that lasts more than a few minutes, or that goes away and comes back. It can feel like uncomfortable pressure, squeezing, fullness, or pain.  Discomfort in other areas of the upper body. Symptoms can include pain or discomfort in one or both arms, the back, neck, jaw, or stomach.  Shortness of breath with or without chest discomfort.  Other signs may include breaking out in a cold sweat, nausea, or lightheadedness. Don't wait more than five minutes to call 911 - MINUTES MATTER! Fast action can save your life. Calling 911 is almost always the fastest way to get lifesaving treatment. Emergency Medical Services staff can begin treatment when they arrive -- up to an hour sooner than if someone gets to the hospital by car. The discharge information has been reviewed with the patient and [facility] Rep  and verbalized understanding. Discharge medications reviewed with the patient and [facility] Rep and appropriate educational materials and side effects teaching were provided.   ___________________________________________________________________________________________________________________________________

## 2019-02-08 NOTE — ANESTHESIA POSTPROCEDURE EVALUATION
Procedure(s): UPPER ENDOSCOPY. Anesthesia Post Evaluation Multimodal analgesia: multimodal analgesia used between 6 hours prior to anesthesia start to PACU discharge Patient location during evaluation: bedside Patient participation: complete - patient participated Level of consciousness: awake Pain management: adequate Airway patency: patent Anesthetic complications: no 
Cardiovascular status: stable Respiratory status: acceptable Hydration status: acceptable Post anesthesia nausea and vomiting:  controlled Visit Vitals /47 Pulse (!) 54 Temp 36.6 °C (97.8 °F) Resp 20 Ht 5' 6\" (1.676 m) Wt 54.4 kg (120 lb) SpO2 97% BMI 19.37 kg/m²

## 2019-02-08 NOTE — PERIOP NOTES
Patient confirmed by two identifiers with discharge instructions prior too being provided to patient and caregiver.

## 2019-02-08 NOTE — ANESTHESIA PREPROCEDURE EVALUATION
Anesthetic History No history of anesthetic complications Review of Systems / Medical History Patient summary reviewed and pertinent labs reviewed Pulmonary Within defined limits Neuro/Psych Within defined limits 
seizures: poorly controlled Comments: Mental disability SDH 2001 Last seizure 1/28/19 Cardiovascular Within defined limits Hypertension Exercise tolerance: <4 METS: Wheel chair bound Comments: EF= 55-60%, neg wma GI/Hepatic/Renal 
Within defined limits Endo/Other Within defined limits Hypothyroidism Other Findings Physical Exam 
 
Airway Mallampati: II 
TM Distance: 4 - 6 cm Neck ROM: normal range of motion Mouth opening: Normal 
 
 Cardiovascular Regular rate and rhythm,  S1 and S2 normal,  no murmur, click, rub, or gallop Rhythm: regular Rate: normal 
 
 
 
 Dental 
 
Dentition: Poor dentition Comments: The patient has very poor dentition. Loose, broken, and or missing teeth. I explained the risk of dental damage and or loss. The patient understands and accepts these risks. Pulmonary Breath sounds clear to auscultation Abdominal 
GI exam deferred Other Findings Anesthetic Plan ASA: 3 Anesthesia type: MAC Induction: Intravenous Anesthetic plan and risks discussed with: Sibling

## 2019-02-08 NOTE — PROGRESS NOTES
[email or web address] 
[phone] [address] [facility], [address] Procedure Note Edgar Petit 1950 
[id number] Date of Procedure: 2/8/2019 Preoperative diagnosis: DYSPHAGIA, UNSPECIFIED PERSONAL HISTORY OF COLON POLYPS Postoperative diagnosis: Hiatal Hernia vs Intrathoracic Stomach Type of Anesthesia: MAC (Monitored anesthesia care) Description of findings: same as post op dx Procedure: Procedure(s): UPPER ENDOSCOPY :  Dr. Isaura Mix MD 
 
Assistant(s): Endoscopy Technician-1: Chary Villegas Endoscopy Technician-2: Maddie Hooks Endoscopy RN-1: Rasheed Tolentino RN Endoscopy RN-2: Heather Dela Cruz RN Devices/implants/grafts/tissues/prosthesis: None EBL:None Specimens: * No specimens in log * Findings: See printed and scanned procedure note Complications: None Dr. Isaura Mix MD 
2/8/2019  1:13 PM

## 2019-02-08 NOTE — H&P
[email or web address] 
[phone] GASTROENTEROLOGY Pre-Procedure H and P Impression/Plan: 1. This patient is consented for an EGD for dysphagia Chief Complaint: dysphagia HPI: 
Rosaline Dawson is a 71 y.o. male who is being is having an EGD for dysphagia PMH:  
Past Medical History:  
Diagnosis Date  Cataract 12/10/2009  Chronic back pain History of chronic back problems off and on for the past several years. He has responded to back injections in the past.    
 Dysphagia  Fall The patient fell during a seizure. He noted increased back pain since that time. X-rays were reported negative at [facility].    
 Hearing loss 12/10/2009  
 does not always wear hearing aids  Hypertension  Hypothyroidism 12/10/2009  Mental retardation 12/10/2009  
 mild MR  
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  
 none recently  Seizures ([address]  Subdural hematoma (HCC)   
 S/P fall 11/3/2011  Thyroid disease PSH:  
Past Surgical History:  
Procedure Laterality Date  HX CATARACT REMOVAL    
 right eye  HX ORTHOPAEDIC    
 vertebral compression fracture Social HX:  
Social History Socioeconomic History  Marital status: SINGLE Spouse name: Not on file  Number of children: Not on file  Years of education: Not on file  Highest education level: Not on file Social Needs  Financial resource strain: Not on file  Food insecurity - worry: Not on file  Food insecurity - inability: Not on file  Transportation needs - medical: Not on file  Transportation needs - non-medical: Not on file Occupational History  Not on file Tobacco Use  Smoking status: Never Smoker  Smokeless tobacco: Never Used Substance and Sexual Activity  Alcohol use: No  
 Drug use: No  
 Sexual activity: No  
Other Topics Concern [address] Service Not Asked  Blood Transfusions Not Asked  Caffeine Concern Yes Comment: 1 cup day  Occupational Exposure Not Asked [facility] Hazards Not Asked  Sleep Concern Not Asked  Stress Concern Not Asked  Weight Concern Not Asked  Special Diet Not Asked  Back Care Not Asked  Exercise Yes Comment: patient tries to be active everyday  Bike Helmet Not Asked [address] Yes  Self-Exams Not Asked Social History Narrative  Not on file FHX:  
Family History Problem Relation Age of Onset  Cancer Mother  Cancer Father Allergy: Allergies Allergen Reactions  Levaquin [Levofloxacin] Rash Other reaction(s): mild rash/itching  Other Medication Other (comments) Bee stings PPD- skin test  
 Pcn [Penicillins] Unable to Obtain Other reaction(s): unknown  Tuberculin, Old Skin Test Other (comments)  Venom-Honey Bee Other reaction(s): anaphylaxis/angioedema, swelling Home Medications:  
 
Medications Prior to Admission Medication Sig  levothyroxine (SYNTHROID) 88 mcg tablet TAKE 1 TABLET BY MOUTH DAILY BEFORE BREAKFAST  metoprolol tartrate (LOPRESSOR) 50 mg tablet TAKE 1 TABLET BY MOUTH TWICE DAILY  calcium-cholecalciferol, D3, (CALTRATE 600+D) tablet TAKE 1 TABLET BY MOUTH TWICE DAILY  vitamin a & d (A&D) ointment Apply to affected areas of the buttock as needed with  brief changes  multivit-min-FA-lycopen-lutein (CENTRUM SILVER) 0.4-300-250 mg-mcg-mcg tab Take 1 Tab by mouth daily.  megestrol (MEGACE) 40 mg tablet TAKE 1 TABLET BY MOUTH ONCE DAILY  fluticasone (FLONASE) 50 mcg/actuation nasal spray 2 Sprays by Both Nostrils route daily.  LORazepam (ATIVAN) 0.5 mg tablet Take 0.5 mg by mouth every evening.  mirtazapine (REMERON) 30 mg tablet Take 30 mg by mouth every evening.  lacosamide (VIMPAT) 200 mg Tab tablet Take 200 mg by mouth two (2) times a day.  felbamate (FELBATOL) 600 mg tablet Take 1 Tab by mouth four (4) times daily.  topiramate (TOPAMAX) 200 mg tablet Take 200 mg by mouth two (2) times a day.  ibuprofen (MOTRIN) 600 mg tablet Take 1 Tab by mouth every twelve (12) hours as needed for Pain. Take with food (Patient taking differently: Take 600 mg by mouth every six (6) hours as needed for Pain. Take with food)  fexofenadine (ALLEGRA) 180 mg tablet Take 1 Tab by mouth daily.  docusate sodium (COLACE) 100 mg capsule Take 1 Cap by mouth nightly as needed for Constipation.  EPIPEN 2-RAJNI 0.3 mg/0.3 mL injection INJECT I.M. 0.3ML ONCE AS NEEDED FOR BEE STINGS  denosumab (PROLIA) 60 mg/mL injection 60 mg by SubCUTAneous route every 6 months.  acetaminophen (TYLENOL) 500 mg tablet Take  by mouth every six (6) hours as needed for Pain. Review of Systems:  
 
Constitutional: No fevers, chills, weight loss, fatigue. Skin: No rashes, pruritis, jaundice, ulcerations, erythema. HENT: No headaches, nosebleeds, sinus pressure, rhinorrhea, sore throat. Eyes: No visual changes, blurred vision, eye pain, photophobia, jaundice. Cardiovascular: No chest pain, heart palpitations. Respiratory: No cough, SOB, wheezing, chest discomfort, orthopnea. Gastrointestinal:   
Genitourinary: No dysuria, bleeding, discharge, pyuria. Musculoskeletal: No weakness, arthralgias, wasting. Endo: No sweats. Heme: No bruising, easy bleeding. Allergies: As noted. Neurological: Cranial nerves intact. Alert and oriented. Gait not assessed. Psychiatric:  No anxiety, depression, hallucinations. Visit Vitals /70 Pulse 63 Temp 97.4 °F (36.3 °C) Resp 18 Ht 5' 6\" (1.676 m) Wt 54.4 kg (120 lb) SpO2 96% BMI 19.37 kg/m² Physical Assessment:  
 
constitutional: appearance: well developed, well nourished, normal habitus, no deformities, in no acute distress.   
skin: inspection: no rashes, ulcers, icterus or other lesions; no clubbing or telangiectasias. palpation: no induration or subcutaneos nodules. eyes: inspection: normal conjunctivae and lids; no jaundice pupils: normal 
ENMT: mouth: normal oral mucosa,lips and gums; good dentition. oropharynx: normal tongue, hard and soft palate; posterior pharynx without erithema, exudate or lesions. neck: thyroid: normal size, consistency and position; no masses or tenderness. respiratory: effort: normal chest excursion; no intercostal retraction or accessory muscle use. cardiovascular: abdominal aorta: normal size and position; no bruits. palpation: PMI of normal size and position; normal rhythm; no thrill or murmurs. abdominal: abdomen: normal consistency; no tenderness or masses. hernias: no hernias appreciated. liver: normal size and consistency. spleen: not palpable. rectal: hemoccult/guaiac: not performed. musculoskeletal: digits and nails: no clubbing, cyanosis, petechiae or other inflammatory conditions. gait: normal gait and station head and neck: normal range of motion; no pain, crepitation or contracture. spine/ribs/pelvis: normal range of motion; no pain, deformity or contracture. neurologic: cranial nerves: II-XII normal.  
psychiatric: judgement/insight: within normal limits. memory: within normal limits for recent and remote events. mood and affect: no evidence of depression, anxiety or agitation. orientation: oriented to time, space and person. Basic Metabolic Profile No results for input(s): NA, K, CL, CO2, BUN, GLU, CA, MG, PHOS in the last 72 hours. No lab exists for component: CREAT  
  
CBC w/Diff No results for input(s): WBC, RBC, HGB, HCT, MCV, MCH, MCHC, RDW, PLT, HGBEXT, HCTEXT, PLTEXT in the last 72 hours. No lab exists for component: MPV No results for input(s): GRANS, LYMPH, EOS, PRO, MYELO, METAS, BLAST in the last 72 hours. No lab exists for component: MONO, BASO Hepatic Function No results for input(s): ALB, TP, TBILI, GPT, SGOT, AP, AML, LPSE in the last 72 hours. No lab exists for component: DBILI Héctor No results for input(s): PTP, INR, APTT in the last 72 hours. No lab exists for component: INREXT Isaura Mix MD 
[facility] [place] [address], [facility] Cell: [phone] Direct pager: [phone] [email or web address] 
[email or web address]

## 2019-02-13 NOTE — PATIENT INSTRUCTIONS
Influenza Virus Vaccine (By injection) Influenza Virus Vaccine (in-floo-EN-za VYE-jb VAX-een) Helps prevent infection with influenza (flu) virus. Brand Name(s): Afluria 0829-5274 Formula, Jordis Loth [id number] Formula, Afluria Quadrivalent [id number] Formula, Afluria Quadrivalent 7120-8325 Formula, FluLaval Quadrivalent [id number] Formula, FluLaval Quadrivalent 8191-4848 Formula, Fluad 3613-0522 Formula, Fluad 8160-1018 Formula, Fluarix Quadrivalent 7432-9798 Formula, Fluarix Quadrivalent 4939-9846 Formula, Flublok 9302-9526 Formula, Flublok 2292-0923 Formula, Flublok Quadrivalent 1465-5687 Formula, Flucelvax Quadrivalent 8869-7706 Formula, Flucelvax Quadrivalent 8368-5368 Formula There may be other brand names for this medicine. When This Medicine Should Not Be Used: This vaccine is not right for everyone. You should not receive it if you had an allergic reaction to flu vaccine. If you are allergic to eggs, tell the caregiver who is going to give you the injection. Some brands of this vaccine contain egg proteins and could cause an allergic reaction. How to Use This Medicine:  
Injectable · The vaccine is given as a shot into a muscle or into your skin, usually in the shoulder area. The shot could be given in the thigh for babies and young children. · A nurse or other health provider will give you this medicine. · Read and follow the patient instructions that come with this medicine. Talk to your doctor or pharmacist if you have any questions. · A child who is younger than 5years old and who has not had a flu shot before may need 2 shots. The second shot should be given about 1 month after the first. 
· Missed dose: Most people need only 1 dose of the vaccine. If your child needs a second dose, it is important for the vaccine to be given on schedule. If you must cancel an appointment, make a new one right away. Drugs and Foods to Avoid: Ask your doctor or pharmacist before using any other medicine, including over-the-counter medicines, vitamins, and herbal products. · Tell your doctor if you are using a medicine or treatment that weakens your immune system, such as a steroid, radiation, or cancer treatment. This vaccine may not work as well if you are also using these medicines. However, your doctor may still want you to get the vaccine because it can give you some protection. Warnings While Using This Medicine: · Tell your doctor if you are pregnant or breastfeeding or if you have a weak immune system. · Tell your doctor if you ever had an unusual reaction to a flu shot, such as Guillain-Barré syndrome, or if you are allergic to latex. · The flu vaccine may not protect everyone who receives it. This vaccine will not treat flu symptoms if you have already been infected with the virus. Possible Side Effects While Using This Medicine:  
Call your doctor right away if you notice any of these side effects: · Allergic reaction: Itching or hives, swelling in your face or hands, swelling or tingling in your mouth or throat, chest tightness, trouble breathing · Fainting, dizziness, or lightheadedness · Fever over 103 degrees F 
· Seizures · Severe muscle weakness If you notice these less serious side effects, talk with your doctor:  
· Headache, muscle pain, tiredness · Irritability or crying (in a child) · Redness, pain, swelling, soreness, or a lump where the shot was given If you notice other side effects that you think are caused by this medicine, tell your doctor. Call your doctor for medical advice about side effects. You may report side effects to FDA at 1-629-GTA-0803 © 2017 [facility] Information is for End User's use only and may not be sold, redistributed or otherwise used for commercial purposes. The above information is an  only.  It is not intended as medical advice for individual conditions or treatments. Talk to your doctor, nurse or pharmacist before following any medical regimen to see if it is safe and effective for you.

## 2019-02-13 NOTE — PROGRESS NOTES
Patient here for HTN follow up. 1. Have you been to the ER, urgent care clinic since your last visit? Hospitalized since your last visit? No 
 
2. Have you seen or consulted any other health care providers outside of the [address] [place] [address] since your last visit? Include any pap smears or colon screening. Neurologist- Dr Kathrin Graham, Patient had an endoscopy February 6, 2019.

## 2019-02-13 NOTE — PROGRESS NOTES
HISTORY OF PRESENT ILLNESS Manasa Armijo is a 71 y.o. male. HPI Patient is here today for evaluation and treatment of: Hypertension Hypertension:  BP is stable; He is on meds as listed below. Pt needs a refill on his MVI and Megace. Has had recent upper endoscopy per his [facility] counselor. Pt does not consent to a  Flu shot today. Pt is without complaints today. [facility] counselor has no particular concerns today regarding patient. Current Outpatient Medications:  
  multivit-min-FA-lycopen-lutein (CENTRUM SILVER) 0.4-300-250 mg-mcg-mcg tab, Take 1 Tab by mouth daily. , Disp: 30 Tab, Rfl: 11 
  megestrol (MEGACE) 40 mg tablet, TAKE 1 TABLET BY MOUTH ONCE DAILY, Disp: 30 Tab, Rfl: 6 
  ibuprofen (MOTRIN) 600 mg tablet, Take 1 Tab by mouth every twelve (12) hours as needed for Pain. Take with food (Patient taking differently: Take 600 mg by mouth every six (6) hours as needed for Pain. Take with food), Disp: 20 Tab, Rfl: 0 
  levothyroxine (SYNTHROID) 88 mcg tablet, TAKE 1 TABLET BY MOUTH DAILY BEFORE BREAKFAST, Disp: 30 Tab, Rfl: 6 
  metoprolol tartrate (LOPRESSOR) 50 mg tablet, TAKE 1 TABLET BY MOUTH TWICE DAILY, Disp: 60 Tab, Rfl: 6 
  calcium-cholecalciferol, D3, (CALTRATE 600+D) tablet, TAKE 1 TABLET BY MOUTH TWICE DAILY, Disp: 60 Tab, Rfl: 6 
  vitamin a & d (A&D) ointment, Apply to affected areas of the buttock as needed with  brief changes, Disp: 60 g, Rfl: 6 
  fexofenadine (ALLEGRA) 180 mg tablet, Take 1 Tab by mouth daily. , Disp: 30 Tab, Rfl: 6 
  docusate sodium (COLACE) 100 mg capsule, Take 1 Cap by mouth nightly as needed for Constipation. , Disp: 90 Cap, Rfl: 2 
  EPIPEN 2-RAJNI 0.3 mg/0.3 mL injection, INJECT I.M. 0.3ML ONCE AS NEEDED FOR BEE STINGS, Disp: 2 Syringe, Rfl: 0 
  denosumab (PROLIA) 60 mg/mL injection, 60 mg by SubCUTAneous route every 6 months., Disp: , Rfl:  
  fluticasone (FLONASE) 50 mcg/actuation nasal spray, 2 Sprays by Both Nostrils route daily. , Disp: , Rfl:  
  acetaminophen (TYLENOL) 500 mg tablet, Take  by mouth every six (6) hours as needed for Pain., Disp: , Rfl:  
  LORazepam (ATIVAN) 0.5 mg tablet, Take 0.5 mg by mouth every evening., Disp: , Rfl:  
  mirtazapine (REMERON) 30 mg tablet, Take 30 mg by mouth every evening., Disp: , Rfl:  
  lacosamide (VIMPAT) 200 mg Tab tablet, Take 200 mg by mouth two (2) times a day., Disp: , Rfl:  
  felbamate (FELBATOL) 600 mg tablet, Take 1 Tab by mouth four (4) times daily. , Disp: 120 Tab, Rfl: 6 
  topiramate (TOPAMAX) 200 mg tablet, Take 200 mg by mouth two (2) times a day., Disp: , Rfl:  
 
PMH,  Meds, Allergies, Family History, Social history reviewed Review of Systems Constitutional: Negative for chills and fever. Respiratory: Negative for shortness of breath and wheezing. Cardiovascular: Negative for chest pain and palpitations. Physical Exam  
Constitutional: He appears well-developed and well-nourished. No distress. Cardiovascular: Normal rate and regular rhythm. Exam reveals no gallop and no friction rub. No murmur heard. Pulmonary/Chest: Breath sounds normal. No respiratory distress. He has no wheezes. Musculoskeletal: He exhibits no edema. Nursing note and vitals reviewed. Visit Vitals /63 (BP 1 Location: Left arm, BP Patient Position: Sitting) Pulse (!) 52 Temp 98.2 °F (36.8 °C) (Axillary) Resp 18 Ht 5' 6\" (1.676 m) SpO2 95% BMI 19.37 kg/m² ASSESSMENT and PLAN 
  ICD-10-CM ICD-9-CM 1. Acquired hypothyroidism E03.9 244.9 TSH 3RD GENERATION 2. Essential hypertension A20 604.6 METABOLIC PANEL, BASIC  
   LIPID PANEL As above,  
above all stable unless otherwise noted 
 treatment plan as listed below Orders Placed This Encounter  METABOLIC PANEL, BASIC  
 LIPID PANEL  
 TSH 3RD GENERATION  multivit-min-FA-lycopen-lutein (CENTRUM SILVER) 0.4-300-250 mg-mcg-mcg tab  megestrol (MEGACE) 40 mg tablet Refilled meds needed Follow-up Disposition: 
Return in about 4 months (around 6/13/2019) for medicare well. An After Visit Summary was printed and given to the patient. This has been fully explained to the patient, who indicates understanding.

## 2019-05-01 PROBLEM — N13.30 HYDRONEPHROSIS: Status: ACTIVE | Noted: 2019-01-01

## 2019-05-01 PROBLEM — N28.89 RENAL MASS: Status: ACTIVE | Noted: 2019-01-01

## 2019-05-01 PROBLEM — R33.9 URINARY RETENTION: Status: ACTIVE | Noted: 2019-01-01

## 2019-05-09 NOTE — PROGRESS NOTES
Referral received that patient has been discharged from [facility]. Nurse Navigator called [facility] staff to confirm that home health care orders have been received/ home health start of care. Nurse Navigator spoke with a representative with [facility] and was told that the Discharge Summary has been received and that start of care is planned for 5-10-19. Nurse Navigator to review EMR  and follow up with Transition of Care.

## 2019-05-10 NOTE — PROGRESS NOTES
Nurse Navigator attempted to contact patient's sister Ms. Jagjit Whitten at her listed home and mobile phone numbers. There was no answer to either phone call. A voicemail message was left at each phone contact requesting a non-emergency return telephone call.

## 2019-05-13 NOTE — PROGRESS NOTES
Hospital Discharge Follow-Up Date/Time:  2019 10:42 AM 
 
Patient was admitted to [facility] on 19  and discharged on  19 for:  
 
Discharge diagnosis: 
Left renal mass with concerns about malignancy Obstructive uropathy with left-sided hydronephrosis and hydroureter Constipation with fecal impaction UTI Cholelithiasis Mental retardation Seizure disorder Hiatal hernia * Per Discharge Summary of 19 by Dr. Richard Hancock. The physician discharge summary was available at the time of outreach. Patient was contacted within 3 business days of discharge. Top Challenges reviewed with the provider Patient non-verbal per patient's  sister Patient dc with folety catheter. Please see discharge diagnosis from discharge summary of 19. Method of communication with provider : 
- Chart routed Inpatient RRAT score: 17, Moderate Was this a readmission? no  
Patient stated reason for the readmission: n/a Nurse Navigator (NN) contacted the family by telephone to perform post hospital discharge assessment. Verified name and  with family as identifiers. Provided introduction to self, and explanation of the Nurse Navigator role. Reviewed discharge instructions  with family who verbalized understanding. Family given an opportunity to ask questions and does not have any further questions or concerns at this time. Disease Specific:   N/A Summary of patient's top problems: 1. Patient is non-verbal 
2. Patient dc with alex catheter Home Health orders at discharge:  
Yes  
 
 
[address]: [facility] Date of initial visit:  
Scheduled for 5/10/19 Durable Medical Equipment ordered/company: none noted Durable Medical Equipment received: n/a Barriers to care? None noted at this time Advance Care Planning:  
Does patient have an Advance Directive:  reviewed and current Medication(s):  
 New Medications at Discharge: - START taking these medications  
  Details  
polyethylene glycol (MIRALAX) 17 gram packet Take 1 Packet by mouth two (2) times a day. Qty: 60 Packet, Refills: 2  
   
senna (SENNA) 8.6 mg tablet Take 1 Tab by mouth two (2) times a day. Qty: 60 Tab, Refills: 2 Changed Medications at Discharge: CONTINUE these medications which have CHANGED  
  Details  
docusate sodium (COLACE) 100 mg capsule Take 1 Cap by mouth two (2) times a day. Qty: 90 Cap, Refills: 2 Discontinued Medications at Discharge:  
None noted Patient's sister Mrs. Rendon related that she is patient's Power of . Nurse Navigator related that part of the role is to review discharge medications with the facility staff who are caring for patient. Mrs. Andrei Butcher stated that [facility] is following patient. She ( Mrs Sara Colon) was with them for over 2 hours recently and medications are correct. Mrs. Andrei Butcher states that \" everything is OK\". Nurse Navigator will defer to Mrs. Puga's wishes and not contact staff with [facility]  for follow up. Referral to Pharm D needed: no  
 
Current Outpatient Medications Medication Sig  
 calcium-cholecalciferol, D3, (CALTRATE 600+D) tablet TAKE 1 TABLET BY MOUTH TWICE DAILY  fexofenadine (ALLEGRA) 180 mg tablet TAKE 1 TABLET BY MOUTH ONCE DAILY BEFORE BREAKFAST  levothyroxine (SYNTHROID) 88 mcg tablet TAKE 1 TABLET BY MOUTH DAILY BEFORE BREAKFAST  metoprolol tartrate (LOPRESSOR) 50 mg tablet TAKE 1 TABLET BY MOUTH TWICE DAILY  docusate sodium (COLACE) 100 mg capsule Take 1 Cap by mouth two (2) times a day.  senna (SENNA) 8.6 mg tablet Take 1 Tab by mouth two (2) times a day.  polyethylene glycol (MIRALAX) 17 gram packet Take 1 Packet by mouth two (2) times a day.  multivit-min-FA-lycopen-lutein (CENTRUM SILVER) 0.4-300-250 mg-mcg-mcg tab Take 1 Tab by mouth daily.  megestrol (MEGACE) 40 mg tablet TAKE 1 TABLET BY MOUTH ONCE DAILY  ibuprofen (MOTRIN) 600 mg tablet Take 1 Tab by mouth every twelve (12) hours as needed for Pain. Take with food (Patient taking differently: Take 600 mg by mouth every six (6) hours as needed for Pain. Take with food)  levothyroxine (SYNTHROID) 88 mcg tablet TAKE 1 TABLET BY MOUTH DAILY BEFORE BREAKFAST  metoprolol tartrate (LOPRESSOR) 50 mg tablet TAKE 1 TABLET BY MOUTH TWICE DAILY  calcium-cholecalciferol, D3, (CALTRATE 600+D) tablet TAKE 1 TABLET BY MOUTH TWICE DAILY  vitamin a & d (A&D) ointment Apply to affected areas of the buttock as needed with  brief changes  EPIPEN 2-RAJNI 0.3 mg/0.3 mL injection INJECT I.M. 0.3ML ONCE AS NEEDED FOR BEE STINGS  denosumab (PROLIA) 60 mg/mL injection 60 mg by SubCUTAneous route every 6 months.  fluticasone (FLONASE) 50 mcg/actuation nasal spray 2 Sprays by Both Nostrils route daily.  acetaminophen (TYLENOL) 500 mg tablet Take  by mouth every six (6) hours as needed for Pain.  LORazepam (ATIVAN) 0.5 mg tablet Take 0.5 mg by mouth every evening.  mirtazapine (REMERON) 30 mg tablet Take 30 mg by mouth every evening.  lacosamide (VIMPAT) 200 mg Tab tablet Take 200 mg by mouth two (2) times a day.  felbamate (FELBATOL) 600 mg tablet Take 1 Tab by mouth four (4) times daily.  topiramate (TOPAMAX) 200 mg tablet Take 200 mg by mouth two (2) times a day. No current facility-administered medications for this visit. There are no discontinued medications. [facility] follow up appointment(s):  
Future Appointments Date Time Provider Vern Hopei 5/15/2019 12:40 PM Oh Barrera MD [address] [facility]  
6/5/2019  1:00 PM Ritu Medina MD [id number]  
6/17/2019 11:00 AM Linda Virgen MD 11 [facility]  
8/27/2019 12:30 PM [facility]  
10/29/2019 10:30 AM [facility] FAST TRACK NURSE [facility] follow up appointment(s): None noted at this time. [facility]:  out of service area Goals  Attends follow-up appointments as directed. [employer] Date:  6-8-19 5/13/2019 Patient has a follow up appointment scheduled with Dr. Banks Staff for 5-15-19. Staff with [facility] to provide transportation for patient to appointment.  Supportive resources in place to maintain patient in the community (ie. Home Health, DME equipment, refer to, medication assistant plan, etc.) [employer] Date:  6/9/19 5/13/2019 [address] [place] [address] to Provide skilled home care services. 5/13/2019 Patient resides at the [facility]. Patient resides in a supervised apartment setting with caregivers. Patient's sister relates that patient has lived at the [facility] for 15 years.

## 2019-05-15 NOTE — PROGRESS NOTES
HISTORY OF PRESENT ILLNESS  Chula Fuentes is a 71 y.o. male. HPI  Patient is here today for evaluation and treatment of: Hospital Follow Up:    Hospital Follow Up:  Pt was seen by GI and had a CT as an outpatient and the results showed a renal mass and hydronephrosis. He was advised to present to the ED at which time he was admitted for the same. He was disimpacted and the hydronephrosis resolved. He was placed on miralax and colace. He is on a soft food diet. Pt was admitted on 5/1/2019 and discharged on .   5/8/2019  Tracy Rihcard needs an order to manage his alex. He has been eating; He has a Hiatal hernia;     Staff requests a [facility] parking placard;  PT is wheelchair bound. NO SOB; denies any recent fever; lethargy. Appetite is stable. He is here with a CATHLEEN Cole. She assists with the history. Current Outpatient Medications:     fluticasone propionate (FLONASE) 50 mcg/actuation nasal spray, 2 Sprays by Both Nostrils route daily. , Disp: 1 Bottle, Rfl: 3    mirtazapine (REMERON) 30 mg tablet, Take 1 Tab by mouth every evening., Disp: 30 Tab, Rfl: 1    LORazepam (ATIVAN) 0.5 mg tablet, Take 1 Tab by mouth nightly. Max Daily Amount: 0.5 mg., Disp: 30 Tab, Rfl: 1    levothyroxine (SYNTHROID) 88 mcg tablet, Take 1 Tab by mouth Daily (before breakfast). , Disp: 30 Tab, Rfl: 6    fexofenadine (ALLEGRA) 180 mg tablet, Take 1 Tab by mouth daily as needed for Allergies. , Disp: 30 Tab, Rfl: 6    calcium-cholecalciferol, D3, (CALTRATE 600+D) tablet, TAKE 1 TABLET BY MOUTH TWICE DAILY, Disp: 60 Tab, Rfl: 6    metoprolol tartrate (LOPRESSOR) 50 mg tablet, TAKE 1 TABLET BY MOUTH TWICE DAILY, Disp: 60 Tab, Rfl: 6    docusate sodium (COLACE) 100 mg capsule, Take 1 Cap by mouth two (2) times a day., Disp: 90 Cap, Rfl: 2    senna (SENNA) 8.6 mg tablet, Take 1 Tab by mouth two (2) times a day., Disp: 60 Tab, Rfl: 2    polyethylene glycol (MIRALAX) 17 gram packet, Take 1 Packet by mouth two (2) times a day., Disp: 60 Packet, Rfl: 2    multivit-min-FA-lycopen-lutein (CENTRUM SILVER) 0.4-300-250 mg-mcg-mcg tab, Take 1 Tab by mouth daily. , Disp: 30 Tab, Rfl: 11    megestrol (MEGACE) 40 mg tablet, TAKE 1 TABLET BY MOUTH ONCE DAILY, Disp: 30 Tab, Rfl: 6    ibuprofen (MOTRIN) 600 mg tablet, Take 1 Tab by mouth every twelve (12) hours as needed for Pain. Take with food (Patient taking differently: Take 600 mg by mouth every six (6) hours as needed for Pain. Take with food), Disp: 20 Tab, Rfl: 0    vitamin a & d (A&D) ointment, Apply to affected areas of the buttock as needed with  brief changes, Disp: 60 g, Rfl: 6    EPIPEN 2-RAJNI 0.3 mg/0.3 mL injection, INJECT I.M. 0.3ML ONCE AS NEEDED FOR BEE STINGS, Disp: 2 Syringe, Rfl: 0    denosumab (PROLIA) 60 mg/mL injection, 60 mg by SubCUTAneous route every 6 months., Disp: , Rfl:     acetaminophen (TYLENOL) 500 mg tablet, Take  by mouth every six (6) hours as needed for Pain., Disp: , Rfl:     lacosamide (VIMPAT) 200 mg Tab tablet, Take 200 mg by mouth two (2) times a day., Disp: , Rfl:     felbamate (FELBATOL) 600 mg tablet, Take 1 Tab by mouth four (4) times daily. , Disp: 120 Tab, Rfl: 6    topiramate (TOPAMAX) 200 mg tablet, Take 200 mg by mouth two (2) times a day., Disp: , Rfl:           PMH,  Meds, Allergies, Family History, Social history reviewed        Review of Systems   Constitutional: Negative for chills and fever. Cardiovascular: Negative for chest pain, palpitations and orthopnea.        Physical Exam   Visit Vitals  BP 95/57 (BP 1 Location: Left arm, BP Patient Position: Sitting)   Pulse (!) 54   Temp 97.5 °F (36.4 °C) (Oral)   Resp 18   Ht 5' 6\" (1.676 m)   SpO2 93%   BMI 22.60 kg/m²     General appearance: alert, cooperative, no distress, appears stated age  Neck: supple, symmetrical, trachea midline, no adenopathy, thyroid: not enlarged, symmetric, no tenderness/mass/nodules, no carotid bruit and no JVD  Lungs: clear to auscultation bilaterally  Heart: regular rate and rhythm, S1, S2 normal, no murmur, click, rub or gallop  Abdomen: soft, non-tender. Bowel sounds normal. No masses,  no organomegaly  Extremities: extremities normal, atraumatic, no cyanosis or edema      ASSESSMENT and PLAN    ICD-10-CM ICD-9-CM    1. Renal mass N28.89 593.9    2. Other hydronephrosis N13.39 591    3. Other constipation K59.09 564.09    4. Urinary retention R33.9 788.20    5. Agitation R45.1 307.9 LORazepam (ATIVAN) 0.5 mg tablet       As above,    treatment plan as listed below  Orders Placed This Encounter    fluticasone propionate (FLONASE) 50 mcg/actuation nasal spray    mirtazapine (REMERON) 30 mg tablet    LORazepam (ATIVAN) 0.5 mg tablet    levothyroxine (SYNTHROID) 88 mcg tablet    fexofenadine (ALLEGRA) 180 mg tablet    calcium-cholecalciferol, D3, (CALTRATE 600+D) tablet    metoprolol tartrate (LOPRESSOR) 50 mg tablet     Follows up- Dr. Paulette Maxwell -   Psychiatry - in process  Dr. Joni Lee - GI  Follow-up and Dispositions    · Return june as scheduled, for hydronephrosis/constipation. An After Visit Summary was printed and given to the patient. This has been fully explained to the patient, who indicates understanding.

## 2019-05-15 NOTE — TELEPHONE ENCOUNTER
Called patient at [phone] (home)  (non-secure line) and did not leave a detailed message. Needs to be informed that written prescription Ativan is ready for  at the office.

## 2019-05-15 NOTE — PROGRESS NOTES
Patient is here for hospital follow up        1. Have you been to the ER, urgent care clinic since your last visit? Hospitalized since your last visit? Hospitalized at [facility] 5-1-19 to 5-8-19     2. Have you seen or consulted any other health care providers outside of the [address] [place] [address] since your last visit? Include any pap smears or colon screening.  No

## 2019-05-19 NOTE — PATIENT INSTRUCTIONS
Learning About Hydronephrosis  What is hydronephrosis? Hydronephrosis is swelling of the kidneys. It is caused by a buildup of urine. This condition can happen if a tube that drains urine from your kidneys is blocked. The blockage can come from within the urinary tract or from pressure outside of the tract. Pregnancy is an example of an outside (external) cause. This condition is often caused by a blockage such as a kidney stone, tumor, or blood clot. It also can be caused by a problem in your urinary system that you were born with (congenital problem). What are the symptoms? Some of the common symptoms are:  · Pain in one or both sides. · Stomach pain. · Blood in your urine. Some people have no symptoms. How is it diagnosed? Your doctor will do an ultrasound to look for a blockage in your urinary system. An ultrasound allows your doctor to see a picture of the organs and other structures in your belly (abdomen). You also may need blood and urine tests. How is it treated? Your treatment depends on the cause of the swelling. If it is caused by a blockage, your treatment will depend on the type of blockage you have. If the blockage is caused by a kidney stone, you may wait for the stone to pass. If hydronephrosis happens during pregnancy, it usually clears up on its own. You may need to have urine drained from your bladder or kidneys. A urinary catheter is a small, flexible tube that can be inserted through the urethra and into the bladder, allowing urine to drain. A nephrostomy catheter is a thin tube placed into your kidney to drain urine. Sometimes surgery is needed to clear the blockage. If you have a blockage, you should begin to feel better after the blockage is gone. Many people recover and have no long-term problems. But some may have kidney damage. If hydronephrosis was left untreated for a long time, the damage can be severe. Severe damage will require further treatment.   Follow-up care is a key part of your treatment and safety. Be sure to make and go to all appointments, and call your doctor if you are having problems. It's also a good idea to know your test results and keep a list of the medicines you take. Where can you learn more? Go to http://elvia-arden.info/. Enter S386 in the search box to learn more about \"Learning About Hydronephrosis. \"  Current as of: March 14, 2018  Content Version: 11.9  © 3632-0579 [employer]. Care instructions adapted under license by [employer] (which disclaims liability or warranty for this information). If you have questions about a medical condition or this instruction, always ask your healthcare professional. [address] any warranty or liability for your use of this information.

## 2019-06-05 NOTE — PROGRESS NOTES
Nurse Navigator (NN) attempted to contact patient's caregiver via telephone call. A voicemail message was heard that the voicemail box is full.

## 2019-06-13 NOTE — PROGRESS NOTES
Patient has graduated from the Transitions of Care Coordination  program on 6-13-19. Patient's symptoms are stable at this time. Patient/family has the ability to self-manage. Care management goals have been completed at this time. No further nurse navigator follow up scheduled. Goals Addressed This Visit's Progress  COMPLETED: Attends follow-up appointments as directed. Target Date:  6-8-19 5/13/2019 Patient has a follow up appointment scheduled with Dr. Blaise Gowers for 5-15-19. Staff with [facility] to provide transportation for patient to appointment. 5/20/2019 Patient attended PCP appointment on 5-15-19  COMPLETED: Supportive resources in place to maintain patient in the community (ie. Home Health, DME equipment, refer to, medication assistant plan, etc.) Target Date:  6/9/19 5/13/2019 [address] [place] [address] to Provide skilled home care services. 5/13/2019 Patient resides at the [facility]. Patient resides in a supervised apartment setting with caregivers. Patient's sister relates that patient has lived at the [facility] for 15 years. 6/5/2019 Nurse Navigator spoke with a representative with [address] [place] [address]. Nurse Navigator was told that patient is receiving nursing services currently and may be discharged from services next week. Patients upcoming visits:   
Future Appointments Date Time Provider Vern Best 6/17/2019 11:00 AM Peng Jose MD [address] [facility]  
6/18/2019  9:50 AM [facility] CANCERCENTER NURSE [facility] JAVI SCHED  
6/28/2019  1:00 PM Wilner Rossi MD [id number]  
8/27/2019 12:30 PM [address] [place] [address]  
10/29/2019 10:30 AM [facility] FAST TRACK NURSE [facility]

## 2019-06-13 NOTE — TELEPHONE ENCOUNTER
Spoke with Isauro Morley at [address] [facility] [address] at [facility] [place] that stated patient's blood pressure readings have been low with a systolic reading of 689-444 and diastolic reading of 74-95. Isauro Morley stated that patient previous heart rate readings have been 50 to 58. Isauro Morley stated that today's blood pressure reading was 106/68 with a heart rate of 62. Isauro Morley was inquiring if medication metoprolol needed to be reduced. Will inform provider. Isauro Morley verbalized understanding.

## 2019-06-13 NOTE — TELEPHONE ENCOUNTER
Spoke with Maria Alejandra Cartwright at [address] [facility] at [facility] to inquire as per Dr. Jose Good, was patient symptomatic? Maria Alejandra Cartwright stated no symptoms, only concern was the readings. Informed that as per Dr. Jose Good, no need at this time to reduce medication and if patient, patient's caregiver, or home health is concerned to make the patient an appointment. Maria Alejandra Cartwright verbalized understanding.

## 2019-06-17 NOTE — PROGRESS NOTES
This is the Subsequent Medicare Annual Wellness Exam, performed 12 months or more after the Initial AWV or the last Subsequent AWV I have reviewed the patient's medical history in detail and updated the computerized patient record. Per health counselor;  Pt is well. Pt is mentally challenged Has some agitation at times; This is why he is on ativan; He needs a refill on med. History Past Medical History:  
Diagnosis Date  Cataract 12/10/2009  Chronic back pain History of chronic back problems off and on for the past several years. He has responded to back injections in the past.    
 Dysphagia  Fall The patient fell during a seizure. He noted increased back pain since that time. X-rays were reported negative at [facility].    
 Hearing loss 12/10/2009  
 does not always wear hearing aids  Hypertension  Hypothyroidism 12/10/2009  Mental retardation 12/10/2009  
 mild MR  
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  
 none recently  Seizures ([address]  Subdural hematoma (HCC)   
 S/P fall 11/3/2011  Thyroid disease Past Surgical History:  
Procedure Laterality Date  HX CATARACT REMOVAL    
 right eye  HX ORTHOPAEDIC    
 vertebral compression fracture Current Outpatient Medications Medication Sig Dispense Refill  fluticasone propionate (FLONASE) 50 mcg/actuation nasal spray 2 Sprays by Both Nostrils route daily. 1 Bottle 3  
 mirtazapine (REMERON) 30 mg tablet Take 1 Tab by mouth every evening. 30 Tab 1  
 LORazepam (ATIVAN) 0.5 mg tablet Take 1 Tab by mouth nightly. Max Daily Amount: 0.5 mg. 30 Tab 1  
 levothyroxine (SYNTHROID) 88 mcg tablet Take 1 Tab by mouth Daily (before breakfast). 30 Tab 6  
 fexofenadine (ALLEGRA) 180 mg tablet Take 1 Tab by mouth daily as needed for Allergies. 30 Tab 6  calcium-cholecalciferol, D3, (CALTRATE 600+D) tablet TAKE 1 TABLET BY MOUTH TWICE DAILY 60 Tab 6  
 metoprolol tartrate (LOPRESSOR) 50 mg tablet TAKE 1 TABLET BY MOUTH TWICE DAILY 60 Tab 6  
 docusate sodium (COLACE) 100 mg capsule Take 1 Cap by mouth two (2) times a day. 90 Cap 2  
 senna (SENNA) 8.6 mg tablet Take 1 Tab by mouth two (2) times a day. 60 Tab 2  polyethylene glycol (MIRALAX) 17 gram packet Take 1 Packet by mouth two (2) times a day. 60 Packet 2  
 multivit-min-FA-lycopen-lutein (CENTRUM SILVER) 0.4-300-250 mg-mcg-mcg tab Take 1 Tab by mouth daily. 30 Tab 11  
 megestrol (MEGACE) 40 mg tablet TAKE 1 TABLET BY MOUTH ONCE DAILY 30 Tab 6  
 vitamin a & d (A&D) ointment Apply to affected areas of the buttock as needed with  brief changes 60 g 6  EPIPEN 2-RAJNI 0.3 mg/0.3 mL injection INJECT I.M. 0.3ML ONCE AS NEEDED FOR BEE STINGS 2 Syringe 0  
 denosumab (PROLIA) 60 mg/mL injection 60 mg by SubCUTAneous route every 6 months.  acetaminophen (TYLENOL) 500 mg tablet Take  by mouth every six (6) hours as needed for Pain.  lacosamide (VIMPAT) 200 mg Tab tablet Take 200 mg by mouth two (2) times a day.  felbamate (FELBATOL) 600 mg tablet Take 1 Tab by mouth four (4) times daily. 120 Tab 6  
 topiramate (TOPAMAX) 200 mg tablet Take 200 mg by mouth two (2) times a day. Allergies Allergen Reactions  Levaquin [Levofloxacin] Rash Other reaction(s): mild rash/itching  Other Medication Other (comments) Bee stings PPD- skin test  
 Pcn [Penicillins] Unable to Obtain Other reaction(s): unknown  Tuberculin, Old Skin Test Other (comments)  Venom-Honey Bee Other reaction(s): anaphylaxis/angioedema, swelling Family History Problem Relation Age of Onset  Cancer Mother  Cancer Father Social History Tobacco Use  Smoking status: Never Smoker  Smokeless tobacco: Never Used Substance Use Topics  Alcohol use: No  
 
Patient Active Problem List  
Diagnosis Code  Hypothyroidism E03.9  Hearing loss H91.90  PPD positive R76.11  
 Seizure ([address] R56.9  Cataract H26.9  Osteoporosis M81.0  Mental retardation F79  Abnormal finding on EKG R94.31  
 Pneumonia J18.9  Hydronephrosis N13.30  
 Urinary retention R33.9  Renal mass N28.89 Depression Risk Factor Screening:  
 
3 most recent PHQ Screens 2/29/2016 PHQ Not Done Medical Reason (indicate in comments) Little interest or pleasure in doing things - Feeling down, depressed, irritable, or hopeless - Total Score PHQ 2 - Alcohol Risk Factor Screening: You do not drink alcohol or very rarely. Functional Ability and Level of Safety:  
Hearing Loss Hearing is declined Activities of Daily Living The home contains: wheelchair Patient needs help with:  transportation, preparing meals, laundry, housework, managing medications, managing money, dressing, bathing and hygiene 24 hour  Supervision. Fall Risk Fall Risk Assessment, last 12 mths 6/17/2019 Able to walk? Yes Fall in past 12 months? No  
Fall with injury? -  
Number of falls in past 12 months - Fall Risk Score -  
 
 
Abuse Screen Patient is not abused Cognitive Screening Evaluation of Cognitive Function: 
Has your family/caregiver stated any concerns about your memory: mentally challenged at baseline Patient Care Team  
Patient Care Team: 
Linda Virgen MD as PCP - [facility]) MD Seymour Noriega MD (Gastroenterology) Jagdish Fortune MD (Dermatology) Filomena Mathis DPM (Podiatry) Maycol Silva MD (Neurology) Andera Ramirez MD (Orthopedic Surgery) Jamal Park MD (Ophthalmology) Seymour Guzman MD (Gastroenterology) Jagdish Fortune MD (Dermatology) Filomena Mathis DPM (Podiatry) Maycol Silva MD (Neurology) Andrea Ramirez MD (Orthopedic Surgery) Jamal Park MD (Ophthalmology) Anup Ayala MD  
 
PE: 
Visit Vitals /61 (BP 1 Location: Left arm, BP Patient Position: Sitting) Pulse (!) 48 Temp 98.6 °F (37 °C) (Oral) Resp 18 Ht 5' 6\" (1.676 m) SpO2 95% BMI 22.60 kg/m² General appearance: alert, cooperative, no distress, appears stated age Neck: supple, symmetrical, trachea midline, no adenopathy, thyroid: not enlarged, symmetric, no tenderness/mass/nodules, no carotid bruit and no JVD Lungs: clear to auscultation bilaterally Heart: regular rate and rhythm, S1, S2 normal, no murmur, click, rub or gallop Extremities: extremities normal, atraumatic, no cyanosis or edema Lab Results Component Value Date/Time Cholesterol, total 179 02/13/2019 12:24 PM  
 HDL Cholesterol 58 02/13/2019 12:24 PM  
 LDL, calculated 100.8 (H) 02/13/2019 12:24 PM  
 VLDL, calculated 20.2 02/13/2019 12:24 PM  
 Triglyceride 101 02/13/2019 12:24 PM  
 CHOL/HDL Ratio 3.1 02/13/2019 12:24 PM  
 
Lab Results Component Value Date/Time Sodium 139 05/03/2019 06:22 AM  
 Potassium 3.8 05/03/2019 06:22 AM  
 Chloride 108 (H) 05/03/2019 06:22 AM  
 CO2 27 05/03/2019 06:22 AM  
 Anion gap 4 (L) 05/03/2019 06:22 AM  
 Glucose 84 05/03/2019 06:22 AM  
 BUN 18 05/03/2019 06:22 AM  
 Creatinine 0.6 05/03/2019 06:22 AM  
 BUN/Creatinine ratio 20 02/13/2019 12:24 PM  
 GFR est AA >60 05/03/2019 06:22 AM  
 GFR est non-AA >60 05/03/2019 06:22 AM  
 Calcium 9.1 05/03/2019 06:22 AM  
 
 
 
 
Assessment/Plan Education and counseling provided: 
Are appropriate based on today's review and evaluation Diagnoses and all orders for this visit: 
 
1. Medicare annual wellness visit, subsequent 2. Agitation -     LORazepam (ATIVAN) 0.5 mg tablet; Take 1 Tab by mouth nightly. Max Daily Amount: 0.5 mg. Other orders 
-     mirtazapine (REMERON) 30 mg tablet; Take 1 Tab by mouth every evening. 
-     senna (SENNA) 8.6 mg tablet; Take 1 Tab by mouth two (2) times a day. There are no preventive care reminders to display for this patient.

## 2019-06-17 NOTE — PROGRESS NOTES
Patient is here today for medicare wellness. 1. Have you been to the ER, urgent care clinic since your last visit? Hospitalized since your last visit? No 
 
2. Have you seen or consulted any other health care providers outside of the [address] [place] [address] since your last visit? Include any pap smears or colon screening.  No

## 2019-06-17 NOTE — PATIENT INSTRUCTIONS
Medicare Wellness Visit, Male The best way to live healthy is to have a lifestyle where you eat a well-balanced diet, exercise regularly, limit alcohol use, and quit all forms of tobacco/nicotine, if applicable. Regular preventive services are another way to keep healthy. Preventive services (vaccines, screening tests, monitoring & exams) can help personalize your care plan, which helps you manage your own care. Screening tests can find health problems at the earliest stages, when they are easiest to treat. [facility] follows the current, evidence-based guidelines published by the [facility] Pavel Erlinda ([facility]) when recommending preventive services for our patients. Because we follow these guidelines, sometimes recommendations change over time as research supports it. (For example, a prostate screening blood test is no longer routinely recommended for men with no symptoms.) Of course, you and your doctor may decide to screen more often for some diseases, based on your risk and co-morbidities (chronic disease you are already diagnosed with). Preventive services for you include: - Medicare offers their members a free annual wellness visit, which is time for you and your primary care provider to discuss and plan for your preventive service needs. Take advantage of this benefit every year! 
-All adults over age 72 should receive the recommended pneumonia vaccines. Current USPSTF guidelines recommend a series of two vaccines for the best pneumonia protection.  
-All adults should have a flu vaccine yearly and an ECG.  All adults age 61 and older should receive a shingles vaccine once in their lifetime.   
-All adults age 38-68 who are overweight should have a diabetes screening test once every three years.  
-Other screening tests & preventive services for persons with diabetes include: an eye exam to screen for diabetic retinopathy, a kidney function test, a foot exam, and stricter control over your cholesterol.  
-Cardiovascular screening for adults with routine risk involves an electrocardiogram (ECG) at intervals determined by the provider.  
-Colorectal cancer screening should be done for adults age 54-65 with no increased risk factors for colorectal cancer. There are a number of acceptable methods of screening for this type of cancer. Each test has its own benefits and drawbacks. Discuss with your provider what is most appropriate for you during your annual wellness visit. The different tests include: colonoscopy (considered the best screening method), a fecal occult blood test, a fecal DNA test, and sigmoidoscopy. 
-All adults born between [facility] should be screened once for Hepatitis C. 
-An Abdominal Aortic Aneurysm (AAA) Screening is recommended for men age 73-68 who has ever smoked in their lifetime. Here is a list of your current Health Maintenance items (your personalized list of preventive services) with a due date: 
Health Maintenance Due Topic Date Due  Shingles Vaccine (1 of 2) 02/03/2000  Glaucoma Screening   02/03/2015 [facility] Annual Well Visit  03/13/2019

## 2019-08-13 NOTE — TELEPHONE ENCOUNTER
Requested Prescriptions     Pending Prescriptions Disp Refills    LORazepam (ATIVAN) 0.5 mg tablet 30 Tab 1     Sig: Take 1 Tab by mouth nightly. Max Daily Amount: 0.5 mg. Pt will run out in two days, she ask can we please expedite.  She was unaware of 3 business day policy on refill request    Also she wanted to let Dr Margoth Dotson know they are still trying to find pt a psychiatrist

## 2019-09-23 NOTE — TELEPHONE ENCOUNTER
Spoke with Samanta Pham, permission to release on file, to inform as per Dr. Calvin Atkins, to follow good hand hygiene. Informed to dispose of diaper properly as to not spread germs. Ms. Raheem Wise stated needing an order to discontinue 4 constipation medications due to patient has diarrhea. Informed to send request via fax and orders will be signed and immediately faxed back to facility. Ms. Raheem Wise verbalized understanding.

## 2019-09-23 NOTE — TELEPHONE ENCOUNTER
Verneda Apgar called to speak with the nurse about patient being seen at [facility] on Sat 19. He was diagnosed with C-Diff  morning and Care coordinator Ms. Zainab Jaramillo needs to know if there is any other precautions they need to take to not spread this to other patients. Also, patient is on Four different constipation medications but has had non stop diarrhea since Saturday. They can not stop giving the patient the constipation meds without a order from the doctor. Zainab Jaramillo can be reached at [phone].

## 2019-10-07 NOTE — TELEPHONE ENCOUNTER
Requested Prescriptions     Pending Prescriptions Disp Refills    LORazepam (ATIVAN) 0.5 mg tablet 30 Tab 1     Sig: Take 1 Tab by mouth nightly.  Max Daily Amount: 0.5 mg.

## 2019-10-10 NOTE — TELEPHONE ENCOUNTER
Requested Prescriptions     Pending Prescriptions Disp Refills    LORazepam (ATIVAN) 0.5 mg tablet 30 Tab 1     Sig: Take 1 Tab by mouth nightly.  Max Daily Amount: 0.5 mg.        Pt's  Griselda Begun, calling for status on Ativan

## 2019-10-10 NOTE — TELEPHONE ENCOUNTER
Spoke with Sarina Galindo, the , to inform that prescription Ativan is ready for . Elizabeth verbalized understanding.

## 2019-10-14 NOTE — H&P
[email or web address]  [phone]      GASTROENTEROLOGY Pre-Procedure H and P      Impression/Plan:   1. This patient is consented for an EGD for dysphagia    Addendum: All lab tests orders pertaining to the procedure have been ordered by the anesthesia personnel and results will be addressed by the anesthesia team  Chief Complaint: dysphagia      HPI:  Roxy Zeng is a 71 y.o. male who is being is having an EGD for dysphagia  PMH:   Past Medical History:   Diagnosis Date    Cataract 12/10/2009    Chronic back pain     History of chronic back problems off and on for the past several years. He has responded to back injections in the past.      Dysphagia     Fall     The patient fell during a seizure. He noted increased back pain since that time. X-rays were reported negative at [facility].     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    Seizure ([address] 12/10/2009    none recently    Seizures ([address]     Subdural hematoma ([address]     S/P fall 11/3/2011    Thyroid disease        PSH:   Past Surgical History:   Procedure Laterality Date    HX CATARACT REMOVAL      right eye    HX ORTHOPAEDIC      vertebral compression fracture       Social HX:   Social History     Socioeconomic History    Marital status: SINGLE     Spouse name: Not on file    Number of children: Not on file    Years of education: Not on file    Highest education level: Not on file   Occupational History    Not on file   Social Needs    Financial resource strain: Not on file    Food insecurity:     Worry: Not on file     Inability: Not on file    Transportation needs:     Medical: Not on file     Non-medical: Not on file   Tobacco Use    Smoking status: Never Smoker    Smokeless tobacco: Never Used   Substance and Sexual Activity    Alcohol use: No    Drug use:  No  Sexual activity: Never   Lifestyle    Physical activity:     Days per week: Not on file     Minutes per session: Not on file    Stress: Not on file   Relationships    Social connections:     Talks on phone: Not on file     Gets together: Not on file     Attends Orthodox service: Not on file     Active member of club or organization: Not on file     Attends meetings of clubs or organizations: Not on file     Relationship status: Not on file    Intimate partner violence:     Fear of current or ex partner: Not on file     Emotionally abused: Not on file     Physically abused: Not on file     Forced sexual activity: Not on file   Other Topics Concern     Service Not Asked    Blood Transfusions Not Asked    Caffeine Concern Yes     Comment: 1 cup day    Occupational Exposure Not Asked   [place] Hazards Not Asked    Sleep Concern Not Asked    Stress Concern Not Asked    Weight Concern Not Asked    Special Diet Not Asked    Back Care Not Asked    Exercise Yes     Comment: patient tries to be active everyday    Bike Helmet Not Asked    Seat Belt Yes    Self-Exams Not Asked   Social History Narrative    Not on file       FHX:   Family History   Problem Relation Age of Onset    Cancer Mother     Cancer Father        Allergy:   Allergies   Allergen Reactions    Levaquin [Levofloxacin] Rash     Other reaction(s): mild rash/itching    Luvox Other (comments)    Other Medication Other (comments)     Bee stings  PPD- skin test    Pcn [Penicillins] Unable to Obtain     Other reaction(s): unknown    Tuberculin, Old Skin Test Other (comments)    Venom-Honey Bee      Other reaction(s): anaphylaxis/angioedema, swelling       Home Medications:     Medications Prior to Admission   Medication Sig    vits A and D-white pet-lanolin (A&D) oint ointment APPLY TO AFFECTED AREAS OF BUTTOCK AS NEEDED WITH BRIEF CHANGES    [START ON 10/15/2019] LORazepam (ATIVAN) 0.5 mg tablet Take 1 Tab by mouth nightly. Max Daily Amount: 0.5 mg.    trimethoprim-sulfamethoxazole (BACTRIM DS) 160-800 mg per tablet Take 1 Tab by mouth two (2) times a day. Dx: UTI    SENNA 8.6 mg tablet TAKE 1 TABLET BY MOUTH TWICE DAILY    lactulose (CHRONULAC) 10 gram/15 mL solution     tamsulosin (FLOMAX) 0.4 mg capsule Take 1 Cap by mouth daily (after dinner).  mirtazapine (REMERON) 30 mg tablet Take 1 Tab by mouth every evening.  fluticasone propionate (FLONASE) 50 mcg/actuation nasal spray 2 Sprays by Both Nostrils route daily.  levothyroxine (SYNTHROID) 88 mcg tablet Take 1 Tab by mouth Daily (before breakfast).  fexofenadine (ALLEGRA) 180 mg tablet Take 1 Tab by mouth daily as needed for Allergies.  metoprolol tartrate (LOPRESSOR) 50 mg tablet TAKE 1 TABLET BY MOUTH TWICE DAILY    multivit-min-FA-lycopen-lutein (CENTRUM SILVER) 0.4-300-250 mg-mcg-mcg tab Take 1 Tab by mouth daily.  megestrol (MEGACE) 40 mg tablet TAKE 1 TABLET BY MOUTH ONCE DAILY    vitamin a & d (A&D) ointment Apply to affected areas of the buttock as needed with  brief changes    EPIPEN 2-RAJNI 0.3 mg/0.3 mL injection INJECT I.M. 0.3ML ONCE AS NEEDED FOR BEE STINGS    denosumab (PROLIA) 60 mg/mL injection 60 mg by SubCUTAneous route every 6 months.  acetaminophen (TYLENOL) 500 mg tablet Take  by mouth every six (6) hours as needed for Pain.  lacosamide (VIMPAT) 200 mg Tab tablet Take 200 mg by mouth two (2) times a day.  felbamate (FELBATOL) 600 mg tablet Take 1 Tab by mouth four (4) times daily.  topiramate (TOPAMAX) 200 mg tablet Take 200 mg by mouth two (2) times a day. Review of Systems:     Constitutional: No fevers, chills, weight loss, fatigue. Skin: No rashes, pruritis, jaundice, ulcerations, erythema. HENT: No headaches, nosebleeds, sinus pressure, rhinorrhea, sore throat. Eyes: No visual changes, blurred vision, eye pain, photophobia, jaundice. Cardiovascular: No chest pain, heart palpitations.    Respiratory: No cough, SOB, wheezing, chest discomfort, orthopnea. Gastrointestinal:    Genitourinary: No dysuria, bleeding, discharge, pyuria. Musculoskeletal: No weakness, arthralgias, wasting. Endo: No sweats. Heme: No bruising, easy bleeding. Allergies: As noted. Neurological: Cranial nerves intact. Alert and oriented. Gait not assessed. Psychiatric:  No anxiety, depression, hallucinations. There were no vitals taken for this visit. Physical Assessment:     constitutional: appearance: well developed, well nourished, normal habitus, no deformities, in no acute distress. skin: inspection: no rashes, ulcers, icterus or other lesions; no clubbing or telangiectasias. palpation: no induration or subcutaneos nodules. eyes: inspection: normal conjunctivae and lids; no jaundice pupils: normal  ENMT: mouth: normal oral mucosa,lips and gums; good dentition. oropharynx: normal tongue, hard and soft palate; posterior pharynx without erithema, exudate or lesions. neck: thyroid: normal size, consistency and position; no masses or tenderness. respiratory: effort: normal chest excursion; no intercostal retraction or accessory muscle use. cardiovascular: abdominal aorta: normal size and position; no bruits. palpation: PMI of normal size and position; normal rhythm; no thrill or murmurs. abdominal: abdomen: normal consistency; no tenderness or masses. hernias: no hernias appreciated. liver: normal size and consistency. spleen: not palpable. rectal: hemoccult/guaiac: not performed. musculoskeletal: digits and nails: no clubbing, cyanosis, petechiae or other inflammatory conditions. gait: normal gait and station head and neck: normal range of motion; no pain, crepitation or contracture. spine/ribs/pelvis: normal range of motion; no pain, deformity or contracture. neurologic: cranial nerves: II-XII normal.   psychiatric: judgement/insight: within normal limits.  memory: within normal limits for recent and remote events. mood and affect: no evidence of depression, anxiety or agitation. orientation: oriented to time, space and person. Basic Metabolic Profile   No results for input(s): NA, K, CL, CO2, BUN, GLU, CA, MG, PHOS in the last 72 hours. No lab exists for component: CREAT      CBC w/Diff    No results for input(s): WBC, RBC, HGB, HCT, MCV, MCH, MCHC, RDW, PLT, HGBEXT, HCTEXT, PLTEXT in the last 72 hours. No lab exists for component: MPV No results for input(s): GRANS, LYMPH, EOS, PRO, MYELO, METAS, BLAST in the last 72 hours. No lab exists for component: MONO, BASO     Hepatic Function   No results for input(s): ALB, TP, TBILI, GPT, SGOT, AP, AML, LPSE in the last 72 hours. No lab exists for component: DBILI     Coags   No results for input(s): PTP, INR, APTT, INREXT in the last 72 hours. Ovidio Frankel, MD  [facility] [address]  Cell: [phone]  Direct pager: [phone]  [email or web address]  [email or web address]

## 2019-10-14 NOTE — ANESTHESIA PREPROCEDURE EVALUATION
Relevant Problems No relevant active problems Anesthetic History No history of anesthetic complications Review of Systems / Medical History Patient summary reviewed, nursing notes reviewed and pertinent labs reviewed Pulmonary Within defined limits Neuro/Psych  
 
seizures: well controlled Psychiatric history Cardiovascular Hypertension: poorly controlled Exercise tolerance: >4 METS 
  
GI/Hepatic/Renal 
Within defined limits Endo/Other Hypothyroidism Other Findings Physical Exam 
 
Airway Mallampati: III 
TM Distance: 4 - 6 cm Neck ROM: normal range of motion Mouth opening: Normal 
 
 Cardiovascular Rhythm: regular Rate: normal 
 
 
 
 Dental 
 
Dentition: Poor dentition Pulmonary Breath sounds clear to auscultation Abdominal 
Abdominal exam normal 
 
 
 Other Findings Anesthetic Plan ASA: 3 Anesthesia type: MAC Induction: Intravenous Anesthetic plan and risks discussed with: Family and Sibling

## 2019-10-15 NOTE — PROGRESS NOTES
HISTORY OF PRESENT ILLNESS Scott León is a 71 y.o. male. HPI Patient is here today for evaluation and treatment of: /Hypertension/Thyroid Issues Pt was recently seen in ED for vomiting and diarrhea; Was found to have C diff. Was started on Vancomycin. He is scheduled for upper endoscopy  tomorrow with Dr. Vignesh Toure. No further vomiting or diarrhea. Has also been treated for a UTI with Bactrim. He has been seen by Urology . He is eating. Appetite may actually be better than baseline. His BMs are stable. These are being tracked daily. [place] thinks he should have prn miralax. His endocrinologist has stopped the Calcium. Gets prolia injections every 6 months. Needs a flu shot. aide has no other complaints; Pt is noncommunicative/ mentally challenged. Pt has htn;  He is on  Lopressor. Takes med regularly. BP is stable Pt has been noted to have an elevated blood sugar and needs follow up on this. Pt does have agitation for which he does take ativan. He does need a refill on this. Current Outpatient Medications:  
  vits A and D-white pet-lanolin (A&D) oint ointment, APPLY TO AFFECTED AREAS OF BUTTOCK AS NEEDED WITH BRIEF CHANGES, Disp: 113.4 g, Rfl: 98 
  LORazepam (ATIVAN) 0.5 mg tablet, Take 1 Tab by mouth nightly. Max Daily Amount: 0.5 mg., Disp: 30 Tab, Rfl: 1 
  trimethoprim-sulfamethoxazole (BACTRIM DS) 160-800 mg per tablet, Take 1 Tab by mouth two (2) times a day. Dx: UTI, Disp: 28 Tab, Rfl: 0 
  tamsulosin (FLOMAX) 0.4 mg capsule, Take 1 Cap by mouth daily (after dinner). , Disp: 90 Cap, Rfl: 3 
  mirtazapine (REMERON) 30 mg tablet, Take 1 Tab by mouth every evening., Disp: 30 Tab, Rfl: 6 
  fluticasone propionate (FLONASE) 50 mcg/actuation nasal spray, 2 Sprays by Both Nostrils route daily. , Disp: 1 Bottle, Rfl: 3 
  levothyroxine (SYNTHROID) 88 mcg tablet, Take 1 Tab by mouth Daily (before breakfast). , Disp: 30 Tab, Rfl: 6   fexofenadine (ALLEGRA) 180 mg tablet, Take 1 Tab by mouth daily as needed for Allergies. , Disp: 30 Tab, Rfl: 6 
  metoprolol tartrate (LOPRESSOR) 50 mg tablet, TAKE 1 TABLET BY MOUTH TWICE DAILY, Disp: 60 Tab, Rfl: 6 
  multivit-min-FA-lycopen-lutein (CENTRUM SILVER) 0.4-300-250 mg-mcg-mcg tab, Take 1 Tab by mouth daily. , Disp: 30 Tab, Rfl: 11 
  megestrol (MEGACE) 40 mg tablet, TAKE 1 TABLET BY MOUTH ONCE DAILY, Disp: 30 Tab, Rfl: 6 
  vitamin a & d (A&D) ointment, Apply to affected areas of the buttock as needed with  brief changes, Disp: 60 g, Rfl: 6 
  EPIPEN 2-RAJNI 0.3 mg/0.3 mL injection, INJECT I.M. 0.3ML ONCE AS NEEDED FOR BEE STINGS, Disp: 2 Syringe, Rfl: 0 
  denosumab (PROLIA) 60 mg/mL injection, 60 mg by SubCUTAneous route every 6 months., Disp: , Rfl:  
  acetaminophen (TYLENOL) 500 mg tablet, Take  by mouth every six (6) hours as needed for Pain., Disp: , Rfl:  
  lacosamide (VIMPAT) 200 mg Tab tablet, Take 200 mg by mouth two (2) times a day., Disp: , Rfl:  
  felbamate (FELBATOL) 600 mg tablet, Take 1 Tab by mouth four (4) times daily. , Disp: 120 Tab, Rfl: 6 
  topiramate (TOPAMAX) 200 mg tablet, Take 200 mg by mouth two (2) times a day., Disp: , Rfl:  
  lactulose (CHRONULAC) 10 gram/15 mL solution, , Disp: , Rfl:  
 
 
 PMH,  Meds, Allergies, Family History, Social history reviewed Review of Systems Constitutional: Negative for chills and fever. Respiratory: Negative for shortness of breath and wheezing. Cardiovascular: Negative for chest pain and palpitations. Physical Exam  
Visit Vitals /58 (BP 1 Location: Right arm, BP Patient Position: Sitting) Pulse (!) 55 Temp 97.8 °F (36.6 °C) (Oral) Resp 20 Ht 5' 6\" (1.676 m) SpO2 95% BMI 18.56 kg/m² General appearance: alert, cooperative, no distress, appears stated age Lungs: clear to auscultation bilaterally Heart: regular rate and rhythm, S1, S2 normal, no murmur, click, rub or gallop Abdomen: soft, non-tender. Bowel sounds normal. No masses,  no organomegaly Extremities: extremities normal, atraumatic, no cyanosis or edema ASSESSMENT and PLAN 
  ICD-10-CM ICD-9-CM 1. C. difficile colitis- new, resolved A04.72 008.45 2. Agitation R45.1 307.9 LORazepam (ATIVAN) 0.5 mg tablet 3. Essential hypertension I10 401.9 4. Elevated blood sugar D01.8 173.79 METABOLIC PANEL, BASIC HEMOGLOBIN A1C WITH EAG  
5. Encounter for immunization Z23 V03.89 INFLUENZA VACCINE INACTIVATED (IIV), SUBUNIT, ADJUVANTED, IM As above,  
above all stable unless otherwise noted 
 treatment plan as listed below Orders Placed This Encounter  Influenza Vaccine Inactivated (IIV)(FLUAD), Subunit, Adjuvanted, IM, (23138)  METABOLIC PANEL, BASIC  
 HEMOGLOBIN A1C WITH EAG  
 LORazepam (ATIVAN) 0.5 mg tablet  polyethylene glycol (MIRALAX) 17 gram packet Follow-up and Dispositions · Return in about 4 months (around 2/15/2020) for htn, Chol. An After Visit Summary was printed and given to the patient. This has been fully explained to the patient, who indicates understanding.

## 2019-10-15 NOTE — PATIENT INSTRUCTIONS
Clostridium Difficile Colitis: Care Instructions Your Care Instructions Clostridium difficile (also called C. difficile) are bacteria that can cause swelling and irritation of the large intestine, or colon. This inflammation is also called colitis. It can cause diarrhea, fever, and belly cramps. You may get C. difficile colitis if you take antibiotics. The infection is most common in people who are taking antibiotics while in the hospital. It is also common in older people in hospitals and nursing homes. Severe disease could cause the colon to swell to many times its normal size (toxic megacolon). This can cause death and needs emergency treatment. You may have a swollen belly that is painful or tender, a rapid heartbeat, and a fever. Follow-up care is a key part of your treatment and safety. Be sure to make and go to all appointments, and call your doctor if you are having problems. It's also a good idea to know your test results and keep a list of the medicines you take. How can you care for yourself at home? · Your doctor may give you antibiotics to treat C. difficile colitis. If your doctor prescribes an antibiotic, he or she will give you a different antibiotic than the one that caused your infection. Take your antibiotics as directed. Do not stop taking them just because you feel better. You need to take the full course of antibiotics. · To prevent dehydration, drink plenty of fluids, enough so that your urine is light yellow or clear like water. Choose water and other caffeine-free clear liquids until you feel better. If you have kidney, heart, or liver disease and have to limit fluids, talk with your doctor before you increase the amount of fluids you drink. · Begin eating small amounts of mild foods, if you feel like it. Try yogurt that has live cultures of lactobacillus (check the label). ? Avoid spicy foods, fruits, alcohol, and caffeine until 48 hours after all symptoms go away. ? Avoid chewing gum that contains sorbitol. ? Avoid dairy products (except for yogurt with lactobacillus) while you have diarrhea and for 3 days after symptoms go away. · To prevent the spread of C. difficile, practice good hygiene. Keep your hands clean by washing them well and often with soap and clean, running water. This is most important after you use the bathroom and before you make and eat food. Remind everyone in your home to also wash their hands often. Alcohol-based hand sanitizers do not kill C. difficile. · After the diarrhea is better, you are much less likely to spread C. difficile. But you still need to take extra care to keep your home clean. ? Clean bathroom surfaces with a bleach solution to kill any C. difficile spores. To dilute household bleach, follow the directions on the label. When should you call for help? Call 911 if: 
  · You passed out (lost consciousness).  
 Call your doctor now or seek immediate medical care if: 
  · You have a fever over 101°F or shaking chills.  
  · You feel lightheaded or have a fast heart rate.  
  · You pass stools that are almost always bloody.  
  · You have signs of needing more fluids. You have sunken eyes and a dry mouth, and you pass only a little dark urine.  
  · You have severe belly pain with or without bloating.  
  · You have severe vomiting and cannot keep down liquids.  
  · You are not passing any stools or gas.  
 Watch closely for changes in your health, and be sure to contact your doctor if: 
  · You do not get better as expected. Where can you learn more? Go to http://elvia-arden.info/. Enter ([phone] in the search box to learn more about \"Clostridium Difficile Colitis: Care Instructions. \" Current as of: June 9, 2019 Content Version: 12.2 © 7431-2115 [employer].  Care instructions adapted under license by [employer] (which disclaims liability or warranty for this information). If you have questions about a medical condition or this instruction, always ask your healthcare professional. [place] [address] any warranty or liability for your use of this information.

## 2019-10-15 NOTE — PROGRESS NOTES
Patient here for Hypertension/Thyroid Problems follow up. 1. Have you been to the ER, urgent care clinic since your last visit? Hospitalized since your last visit? went to [facility] for blood in the urine 9/21/2019 
 
2. Have you seen or consulted any other health care providers outside of the [address] [place] [address] since your last visit? Include any pap smears or colon screening. Urology Joshua Graft  Dr Cavazos Million

## 2019-10-16 NOTE — ANESTHESIA PREPROCEDURE EVALUATION
Relevant Problems No relevant active problems Anesthetic History No history of anesthetic complications Review of Systems / Medical History Patient summary reviewed, nursing notes reviewed and pertinent labs reviewed Pulmonary Within defined limits Neuro/Psych  
 
seizures Cardiovascular Hypertension: well controlled Exercise tolerance: >4 METS 
  
GI/Hepatic/Renal 
  
GERD Endo/Other Hypothyroidism: well controlled Other Findings Physical Exam 
 
Airway Mallampati: III 
TM Distance: 4 - 6 cm Neck ROM: decreased range of motion Mouth opening: Normal 
 
 Cardiovascular Regular rate and rhythm,  S1 and S2 normal,  no murmur, click, rub, or gallop Rhythm: regular Rate: normal 
 
 
 
 Dental 
 
Dentition: Poor dentition Pulmonary Breath sounds clear to auscultation Abdominal 
Abdominal exam normal 
 
 
 Other Findings Anesthetic Plan ASA: 3 Anesthesia type: MAC Induction: Intravenous Anesthetic plan and risks discussed with: Legal guardian and Family

## 2019-10-16 NOTE — DISCHARGE INSTRUCTIONS
Esophageal Dilation: What to Expect at [address] [place] [address]  After you have esophageal dilation, you will stay at the hospital or surgery center for 1 to 2 hours. This will allow the medicine to wear off. You will be able to go home after your doctor or nurse checks to make sure you are not having any problems. This care sheet gives you a general idea about how long it will take for you to recover. But each person recovers at a different pace. Follow the steps below to get better as quickly as possible. How can you care for yourself at home? Activity    · Rest as much as you need to after you go home.     · You should be able to go back to your usual activities the day after the procedure. Diet    · Follow your doctor's directions for eating after the procedure.     · Drink plenty of fluids (unless your doctor has told you not to). Medicines    · Your doctor will tell you if and when you can restart your medicines. He or she will also give you instructions about taking any new medicines.     · If you take blood thinners, such as warfarin (Coumadin), clopidogrel (Plavix), or aspirin, be sure to talk to your doctor. He or she will tell you if and when to start taking those medicines again. Make sure that you understand exactly what your doctor wants you to do.     · If you have a sore throat the day after the procedure, use an over-the-counter spray to numb your throat. Sucking on throat lozenges and gargling with warm salt water may also help relieve your symptoms. Follow-up care is a key part of your treatment and safety. Be sure to make and go to all appointments, and call your doctor if you are having problems. It's also a good idea to know your test results and keep a list of the medicines you take. When should you call for help? Call 911 anytime you think you may need emergency care.  For example, call if:    · You passed out (lost consciousness).     · You have trouble breathing.     · Your stools are maroon or very bloody    Call your doctor now or seek immediate medical care if:    · You have new or worse belly pain.     · You have a fever.     · You have new or more blood in your stools.     · You are sick to your stomach and cannot drink fluids.     · You cannot pass stools or gas.     · You have pain that does not get better after you take pain medicine.    Watch closely for changes in your health, and be sure to contact your doctor if:    · Your throat still hurts after a day or two.     · You do not get better as expected. Where can you learn more? Go to http://elvia-arden.info/. Enter N621 in the search box to learn more about \"Esophageal Dilation: What to Expect at Home. \"  Current as of: November 7, 2018  Content Version: 12.2  © 7594-7672 [employer]. Care instructions adapted under license by [email or web address] (which disclaims liability or warranty for this information). If you have questions about a medical condition or this instruction, always ask your healthcare professional. [address] any warranty or liability for your use of this information. Learning About Gastric Polyps  What are gastric polyps? Gastric polyps are growths in the stomach. Most people who get them don't have any problems. The cause of most gastric polyps is not known. But some polyps grow in people who use stomach acid reducers called proton pump inhibitors (PPIs). People who have gastritis, ulcers, or an H. pylori infection in the stomach can sometimes get polyps. People who have a parent, brother, or sister with gastric polyps might have them too. Most gastric polyps aren't cancer. But a certain kind of polyp can turn into cancer. What are the symptoms? You may not know that you have gastric polyps. Most polyps don't lead to symptoms. Once in a while, larger polyps may cause bleeding, belly pain, or a blockage in the stomach.   How are polyps diagnosed and treated? Most gastric polyps are found during an endoscopy (say \"ga-XKM-liwh-pee\") that is done for another health problem. Endoscopy is a test that uses a thin flexible tube to allow a doctor to look at the inside of your stomach. Your doctor will treat your polyps based on what he or she sees during this test. If your doctor finds a polyp during the test, he or she may take it out. It will then be tested to make sure it isn't cancer. If your doctor finds H. pylori bacteria in your stomach, he or she will prescribe antibiotics. If you have been taking a PPI, the doctor may prescribe a different medicine instead. Sometimes the doctor may suggest another endoscopy to see how treatment is working. He or she may also suggest this to recheck certain kinds of polyps. The doctor may also suggest a colonoscopy to look for polyps in your colon. Follow-up care is a key part of your treatment and safety. Be sure to make and go to all appointments, and call your doctor if you are having problems. It's also a good idea to know your test results and keep a list of the medicines you take. Where can you learn more? Go to http://elvia-arden.info/. Enter Q845 in the search box to learn more about \"Learning About Gastric Polyps. \"  Current as of: November 7, 2018  Content Version: 12.2  © 7382-8064 [employer]. Care instructions adapted under license by [employer] (which disclaims liability or warranty for this information). If you have questions about a medical condition or this instruction, always ask your healthcare professional. [place] [address] any warranty or liability for your use of this information. Gastritis: Care Instructions  Your Care Instructions    Gastritis is a sore and upset stomach. It happens when something irritates the stomach lining. Many things can cause it. These include an infection such as the flu or something you ate or drank.  Medicines or a sore on the lining of the stomach (ulcer) also can cause it. Your belly may bloat and ache. You may belch, vomit, and feel sick to your stomach. You should be able to relieve the problem by taking medicine. And it may help to change your diet. If gastritis lasts, your doctor may prescribe medicine. Follow-up care is a key part of your treatment and safety. Be sure to make and go to all appointments, and call your doctor if you are having problems. It's also a good idea to know your test results and keep a list of the medicines you take. How can you care for yourself at home? · If your doctor prescribed antibiotics, take them as directed. Do not stop taking them just because you feel better. You need to take the full course of antibiotics. · Be safe with medicines. If your doctor prescribed medicine to decrease stomach acid, take it as directed. Call your doctor if you think you are having a problem with your medicine. · Do not take any other medicine, including over-the-counter pain relievers, without talking to your doctor first.  · If your doctor recommends over-the-counter medicine to reduce stomach acid, such as Pepcid AC, Prilosec, Tagamet HB, or Zantac 75, follow the directions on the label. · Drink plenty of fluids (enough so that your urine is light yellow or clear like water) to prevent dehydration. Choose water and other caffeine-free clear liquids. If you have kidney, heart, or liver disease and have to limit fluids, talk with your doctor before you increase the amount of fluids you drink. · Limit how much alcohol you drink. · Avoid coffee, tea, cola drinks, chocolate, and other foods with caffeine. They increase stomach acid. When should you call for help? Call 911 anytime you think you may need emergency care.  For example, call if:    · You vomit blood or what looks like coffee grounds.     · You pass maroon or very bloody stools.    Call your doctor now or seek immediate medical care if:    · You start breathing fast and have not produced urine in the last 8 hours.     · You cannot keep fluids down.    Watch closely for changes in your health, and be sure to contact your doctor if:    · You do not get better as expected. Where can you learn more? Go to http://elvia-arden.info/. Enter [id number] in the search box to learn more about \"Gastritis: Care Instructions. \"  Current as of: November 7, 2018  Content Version: 12.2  © 5518-5208 [employer]. Care instructions adapted under license by [employer] (which disclaims liability or warranty for this information). If you have questions about a medical condition or this instruction, always ask your healthcare professional. [address] any warranty or liability for your use of this information. DISCHARGE SUMMARY from Nurse    PATIENT INSTRUCTIONS:    After general anesthesia or intravenous sedation, for 24 hours or while taking prescription Narcotics:  · Limit your activities  · Do not drive and operate hazardous machinery  · Do not make important personal or business decisions  · Do  not drink alcoholic beverages  · If you have not urinated within 8 hours after discharge, please contact your surgeon on call. Report the following to your surgeon:  · Excessive pain, swelling, redness or odor of or around the surgical area  · Temperature over 100.5  · Nausea and vomiting lasting longer than 4 hours or if unable to take medications  · Any signs of decreased circulation or nerve impairment to extremity: change in color, persistent  numbness, tingling, coldness or increase pain  · Any questions    *  Please give a list of your current medications to your Primary Care Provider. *  Please update this list whenever your medications are discontinued, doses are      changed, or new medications (including over-the-counter products) are added.     *  Please carry medication information at all times in case of emergency situations. These are general instructions for a healthy lifestyle:    No smoking/ No tobacco products/ Avoid exposure to second hand smoke  Surgeon General's Warning:  Quitting smoking now greatly reduces serious risk to your health. Obesity, smoking, and sedentary lifestyle greatly increases your risk for illness    A healthy diet, regular physical exercise & weight monitoring are important for maintaining a healthy lifestyle    You may be retaining fluid if you have a history of heart failure or if you experience any of the following symptoms:  Weight gain of 3 pounds or more overnight or 5 pounds in a week, increased swelling in our hands or feet or shortness of breath while lying flat in bed. Please call your doctor as soon as you notice any of these symptoms; do not wait until your next office visit. The discharge information has been reviewed with the patient and caregiver. The patient and caregiver verbalized understanding. Discharge medications reviewed with the patient and caregiver and appropriate educational materials and side effects teaching were provided.   ___________________________________________________________________________________________________________________________________

## 2019-10-16 NOTE — ANESTHESIA POSTPROCEDURE EVALUATION
Procedure(s): UPPER ENDOSCOPY w bx's dilation. MAC Anesthesia Post Evaluation Multimodal analgesia: multimodal analgesia not used between 6 hours prior to anesthesia start to PACU discharge Patient location during evaluation: PACU Patient participation: complete - patient participated Level of consciousness: awake and alert Pain score: 0 Airway patency: patent Anesthetic complications: no 
Cardiovascular status: acceptable Respiratory status: acceptable Hydration status: acceptable Post anesthesia nausea and vomiting:  none Vitals Value Taken Time /53 10/16/2019 12:26 PM  
Temp 37 °C (98.6 °F) 10/16/2019 11:58 AM  
Pulse 62 10/16/2019 12:29 PM  
Resp 17 10/16/2019 12:29 PM  
SpO2 98 % 10/16/2019 12:29 PM  
Vitals shown include unvalidated device data.

## 2019-10-16 NOTE — PROGRESS NOTES
[email or web address] 
[phone] Jahaira [address] [facility], [address] Brief Procedure Note Sariah Leon 1950 
[id number] Date of Procedure: 10/16/2019 Preoperative diagnosis: DYSPHAGIA, PARAESOPHAGEAL HERNIA, CHRONIC CONSTIPATION, COLON CANCER SCREENING, SEIZURE Postoperative diagnosis: Gastritis body and antrum bx's,gastric polyp,Gastric polyp, J shaped stomach, dilation 52 fr caro Type of Anesthesia: MAC (Monitored anesthesia care) Description of findings: same as post op dx Procedure: Procedure(s) with comments: UPPER ENDOSCOPY w bx's dilation - 52fr caro :  Dr. Suri Rosa MD 
 
Assistant(s): Endoscopy Technician-1: Lary Vargas Endoscopy RN-1: Bel Rodriguez RN; Arlet Zuleta RN Devices/implants/grafts/tissues/prosthesis: None EBL:None Specimens:  
ID Type Source Tests Collected by Time Destination 1 : Body & Antrum bx's Preservative Gastric  Narcisa Wild MD 10/16/2019 1144 Pathology 2 : Gastric polyp bx's Preservative Gastric  Narcisa Wild MD 10/16/2019 1146 Pathology Findings: See printed and scanned procedure note Complications: None Dr. Suri Rosa MD 
10/16/2019  11:52 AM

## 2019-10-16 NOTE — H&P
[email or web address] 
[phone] GASTROENTEROLOGY Pre-Procedure H and P Impression/Plan: 1. This patient is consented for an EGD for dysphagia Addendum: All lab tests orders pertaining to the procedure have been ordered by the anesthesia personnel and results will be addressed by the anesthesia team 
Chief Complaint: dysphagia HPI: 
Pinky Ruiz is a 71 y.o. male who is being is having an EGD for dysphagia PMH:  
Past Medical History:  
Diagnosis Date  Cataract 12/10/2009  Chronic back pain History of chronic back problems off and on for the past several years. He has responded to back injections in the past.    
 Dysphagia  Fall The patient fell during a seizure. He noted increased back pain since that time. X-rays were reported negative at [facility].    
 Hearing loss 12/10/2009  
 does not always wear hearing aids  Hypertension  Hypothyroidism 12/10/2009  Mental retardation 12/10/2009  
 mild MR  
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  
 none recently  Seizures ([address]  Subdural hematoma (HCC)   
 S/P fall 11/3/2011  Thyroid disease PSH:  
Past Surgical History:  
Procedure Laterality Date  HX CATARACT REMOVAL    
 right eye  HX ORTHOPAEDIC    
 vertebral compression fracture Social HX:  
Social History Socioeconomic History  Marital status: SINGLE Spouse name: Not on file  Number of children: Not on file  Years of education: Not on file  Highest education level: Not on file Occupational History  Not on file Social Needs  Financial resource strain: Not on file  Food insecurity:  
  Worry: Not on file Inability: Not on file  Transportation needs:  
  Medical: Not on file Non-medical: Not on file Tobacco Use  Smoking status: Never Smoker  Smokeless tobacco: Never Used Substance and Sexual Activity  Alcohol use:  No  
  Drug use: No  
 Sexual activity: Never Lifestyle  Physical activity:  
  Days per week: Not on file Minutes per session: Not on file  Stress: Not on file Relationships  Social connections:  
  Talks on phone: Not on file Gets together: Not on file Attends Gnosticist service: Not on file Active member of club or organization: Not on file Attends meetings of clubs or organizations: Not on file Relationship status: Not on file  Intimate partner violence:  
  Fear of current or ex partner: Not on file Emotionally abused: Not on file Physically abused: Not on file Forced sexual activity: Not on file Other Topics Concern [address] Service Not Asked  Blood Transfusions Not Asked  Caffeine Concern Yes Comment: 1 cup day  Occupational Exposure Not Asked Dellar Maidens Hazards Not Asked  Sleep Concern Not Asked  Stress Concern Not Asked  Weight Concern Not Asked  Special Diet Not Asked  Back Care Not Asked  Exercise Yes Comment: patient tries to be active everyday  Bike Helmet Not Asked [address] Yes  Self-Exams Not Asked Social History Narrative  Not on file FHX:  
Family History Problem Relation Age of Onset  Cancer Mother  Cancer Father Allergy: Allergies Allergen Reactions  Levaquin [Levofloxacin] Rash Other reaction(s): mild rash/itching  Luvox Other (comments)  Other Medication Other (comments) Bee stings PPD- skin test  
 Pcn [Penicillins] Unable to Obtain Other reaction(s): unknown  Tuberculin, Old Skin Test Other (comments)  Venom-Honey Bee Other reaction(s): anaphylaxis/angioedema, swelling Home Medications:  
 
Medications Prior to Admission Medication Sig  [START ON 11/15/2019] LORazepam (ATIVAN) 0.5 mg tablet Take 1 Tab by mouth nightly.  Max Daily Amount: 0.5 mg.  
 trimethoprim-sulfamethoxazole (BACTRIM DS) 160-800 mg per tablet Take 1 Tab by mouth two (2) times a day. Dx: UTI  tamsulosin (FLOMAX) 0.4 mg capsule Take 1 Cap by mouth daily (after dinner).  mirtazapine (REMERON) 30 mg tablet Take 1 Tab by mouth every evening.  levothyroxine (SYNTHROID) 88 mcg tablet Take 1 Tab by mouth Daily (before breakfast).  metoprolol tartrate (LOPRESSOR) 50 mg tablet TAKE 1 TABLET BY MOUTH TWICE DAILY  multivit-min-FA-lycopen-lutein (CENTRUM SILVER) 0.4-300-250 mg-mcg-mcg tab Take 1 Tab by mouth daily.  megestrol (MEGACE) 40 mg tablet TAKE 1 TABLET BY MOUTH ONCE DAILY  lacosamide (VIMPAT) 200 mg Tab tablet Take 200 mg by mouth two (2) times a day.  felbamate (FELBATOL) 600 mg tablet Take 1 Tab by mouth four (4) times daily.  topiramate (TOPAMAX) 200 mg tablet Take 200 mg by mouth two (2) times a day.  polyethylene glycol (MIRALAX) 17 gram packet Take 1 Packet by mouth daily.  vits A and D-white pet-lanolin (A&D) oint ointment APPLY TO AFFECTED AREAS OF BUTTOCK AS NEEDED WITH BRIEF CHANGES  
 lactulose (CHRONULAC) 10 gram/15 mL solution  fluticasone propionate (FLONASE) 50 mcg/actuation nasal spray 2 Sprays by Both Nostrils route daily.  fexofenadine (ALLEGRA) 180 mg tablet Take 1 Tab by mouth daily as needed for Allergies.  vitamin a & d (A&D) ointment Apply to affected areas of the buttock as needed with  brief changes  EPIPEN 2-RAJNI 0.3 mg/0.3 mL injection INJECT I.M. 0.3ML ONCE AS NEEDED FOR BEE STINGS  denosumab (PROLIA) 60 mg/mL injection 60 mg by SubCUTAneous route every 6 months.  acetaminophen (TYLENOL) 500 mg tablet Take  by mouth every six (6) hours as needed for Pain. Review of Systems:  
 
Constitutional: No fevers, chills, weight loss, fatigue. Skin: No rashes, pruritis, jaundice, ulcerations, erythema. HENT: No headaches, nosebleeds, sinus pressure, rhinorrhea, sore throat. Eyes: No visual changes, blurred vision, eye pain, photophobia, jaundice. Cardiovascular: No chest pain, heart palpitations. Respiratory: No cough, SOB, wheezing, chest discomfort, orthopnea. Gastrointestinal:   
Genitourinary: No dysuria, bleeding, discharge, pyuria. Musculoskeletal: No weakness, arthralgias, wasting. Endo: No sweats. Heme: No bruising, easy bleeding. Allergies: As noted. Neurological: Cranial nerves intact. Alert and oriented. Gait not assessed. Psychiatric:  No anxiety, depression, hallucinations. Visit Vitals /68 Pulse 60 Temp 99.2 °F (37.3 °C) Resp 18 Ht 5' 6\" (1.676 m) Wt 52.2 kg (115 lb) SpO2 96% BMI 18.56 kg/m² Physical Assessment:  
 
constitutional: appearance: well developed, well nourished, normal habitus, no deformities, in no acute distress. skin: inspection: no rashes, ulcers, icterus or other lesions; no clubbing or telangiectasias. palpation: no induration or subcutaneos nodules. eyes: inspection: normal conjunctivae and lids; no jaundice pupils: normal 
ENMT: mouth: normal oral mucosa,lips and gums; good dentition. oropharynx: normal tongue, hard and soft palate; posterior pharynx without erithema, exudate or lesions. neck: thyroid: normal size, consistency and position; no masses or tenderness. respiratory: effort: normal chest excursion; no intercostal retraction or accessory muscle use. cardiovascular: abdominal aorta: normal size and position; no bruits. palpation: PMI of normal size and position; normal rhythm; no thrill or murmurs. abdominal: abdomen: normal consistency; no tenderness or masses. hernias: no hernias appreciated. liver: normal size and consistency. spleen: not palpable. rectal: hemoccult/guaiac: not performed. musculoskeletal: digits and nails: no clubbing, cyanosis, petechiae or other inflammatory conditions. gait: normal gait and station head and neck: normal range of motion; no pain, crepitation or contracture. spine/ribs/pelvis: normal range of motion; no pain, deformity or contracture. neurologic: cranial nerves: II-XII normal.  
psychiatric: judgement/insight: within normal limits. memory: within normal limits for recent and remote events. mood and affect: no evidence of depression, anxiety or agitation. orientation: oriented to time, space and person. Basic Metabolic Profile Recent Labs 10/15/19 
1221   
K 4.2  CO2 28 BUN 23* * CA 9.7 CBC w/Diff No results for input(s): WBC, RBC, HGB, HCT, MCV, MCH, MCHC, RDW, PLT, HGBEXT, HCTEXT, PLTEXT in the last 72 hours. No lab exists for component: MPV No results for input(s): GRANS, LYMPH, EOS, PRO, MYELO, METAS, BLAST in the last 72 hours. No lab exists for component: MONO, BASO Hepatic Function No results for input(s): ALB, TP, TBILI, GPT, SGOT, AP, AML, LPSE in the last 72 hours. No lab exists for component: DBILI Coags No results for input(s): PTP, INR, APTT, INREXT in the last 72 hours. Henri Turner MD 
[facility] [address] Cell: [phone] Direct pager: [phone] [email or web address] 
[email or web address]

## 2019-10-16 NOTE — PERIOP NOTES
Patient's caregiver stated she fell in the parking lot after leaving to get the car. Caregiver was advised to report to the emergency room for evaluation and to make report, patient's caregiver decline and stated she will be okay.

## 2019-10-25 PROBLEM — R65.21 SEPTIC SHOCK (HCC): Status: ACTIVE | Noted: 2019-01-01

## 2019-10-25 PROBLEM — N39.0 UTI (URINARY TRACT INFECTION): Status: ACTIVE | Noted: 2019-01-01

## 2019-10-25 PROBLEM — A41.9 SEPTIC SHOCK (HCC): Status: ACTIVE | Noted: 2019-01-01

## 2019-10-29 ENCOUNTER — HOSPITAL ENCOUNTER (OUTPATIENT)
Dept: INFUSION THERAPY | Age: 69
End: 2019-10-29

## 2020-04-28 ENCOUNTER — APPOINTMENT (OUTPATIENT)
Dept: INFUSION THERAPY | Age: 70
End: 2020-04-28

## (undated) DEVICE — (D)GLOVE EXAM LG NITRL NS -- DISC BY MFR NO SUB

## (undated) DEVICE — BITE BLOCK ENDOSCP UNIV AD 6 TO 9.4 MM

## (undated) DEVICE — STERILE POLYISOPRENE POWDER-FREE SURGICAL GLOVES: Brand: PROTEXIS

## (undated) DEVICE — BASIN EMESIS 500CC ROSE 250/CS 60/PLT: Brand: MEDEGEN MEDICAL PRODUCTS, LLC

## (undated) DEVICE — ENDOSCOPY PUMP TUBING/ CAP SET: Brand: ERBE

## (undated) DEVICE — FCPS RAD JAW 4LC 240CM W/NDL -- BX/20 RADIAL JAW 4

## (undated) DEVICE — SYR 50ML SLIP TIP NSAF LF STRL --

## (undated) DEVICE — GAUZE SPONGES,16 PLY: Brand: CURITY

## (undated) DEVICE — FLEX ADVANTAGE 3000CC: Brand: FLEX ADVANTAGE

## (undated) DEVICE — FLUFF AND POLYMER UNDERPAD,EXTRA HEAVY: Brand: WINGS

## (undated) DEVICE — SOLUTION IRRIG 1000ML H2O STRL BLT

## (undated) DEVICE — CATHETER SUCT TR FL TIP 14FR W/ O CTRL

## (undated) DEVICE — MEDI-VAC NON-CONDUCTIVE SUCTION TUBING: Brand: CARDINAL HEALTH

## (undated) DEVICE — SYRINGE MED 25GA 3ML L5/8IN SUBQ PLAS W/ DETACH NDL SFTY

## (undated) DEVICE — MEDI-VAC SUCTION HIGH CAPACITY: Brand: CARDINAL HEALTH

## (undated) DEVICE — AIRLIFE™ NASAL OXYGEN CANNULA CURVED, FLARED TIP WITH 14 FOOT (4.3 M) CRUSH-RESISTANT TUBING, OVER-THE-EAR STYLE: Brand: AIRLIFE™